# Patient Record
Sex: FEMALE | Race: WHITE | NOT HISPANIC OR LATINO | Employment: FULL TIME | URBAN - METROPOLITAN AREA
[De-identification: names, ages, dates, MRNs, and addresses within clinical notes are randomized per-mention and may not be internally consistent; named-entity substitution may affect disease eponyms.]

---

## 2017-02-23 ENCOUNTER — TRANSFERRED RECORDS (OUTPATIENT)
Dept: HEALTH INFORMATION MANAGEMENT | Facility: CLINIC | Age: 32
End: 2017-02-23

## 2017-02-23 LAB — PAP-ABSTRACT: NORMAL

## 2019-11-13 ENCOUNTER — ANCILLARY PROCEDURE (OUTPATIENT)
Dept: GENERAL RADIOLOGY | Facility: CLINIC | Age: 34
End: 2019-11-13
Attending: NURSE PRACTITIONER
Payer: COMMERCIAL

## 2019-11-13 ENCOUNTER — OFFICE VISIT (OUTPATIENT)
Dept: FAMILY MEDICINE | Facility: CLINIC | Age: 34
End: 2019-11-13
Payer: COMMERCIAL

## 2019-11-13 VITALS
TEMPERATURE: 96.4 F | SYSTOLIC BLOOD PRESSURE: 96 MMHG | HEIGHT: 62 IN | DIASTOLIC BLOOD PRESSURE: 58 MMHG | WEIGHT: 161 LBS | BODY MASS INDEX: 29.63 KG/M2 | HEART RATE: 108 BPM | OXYGEN SATURATION: 99 %

## 2019-11-13 DIAGNOSIS — G89.29 CHRONIC LEFT-SIDED LOW BACK PAIN, UNSPECIFIED WHETHER SCIATICA PRESENT: ICD-10-CM

## 2019-11-13 DIAGNOSIS — M54.2 CERVICALGIA: ICD-10-CM

## 2019-11-13 DIAGNOSIS — Z30.011 ENCOUNTER FOR INITIAL PRESCRIPTION OF CONTRACEPTIVE PILLS: ICD-10-CM

## 2019-11-13 DIAGNOSIS — Z00.00 ROUTINE GENERAL MEDICAL EXAMINATION AT A HEALTH CARE FACILITY: Primary | ICD-10-CM

## 2019-11-13 DIAGNOSIS — R10.32 LLQ ABDOMINAL PAIN: ICD-10-CM

## 2019-11-13 DIAGNOSIS — M54.50 CHRONIC LEFT-SIDED LOW BACK PAIN, UNSPECIFIED WHETHER SCIATICA PRESENT: ICD-10-CM

## 2019-11-13 PROBLEM — Z92.29 HISTORY OF BCG VACCINATION: Status: ACTIVE | Noted: 2017-12-08

## 2019-11-13 LAB
ALBUMIN UR-MCNC: NEGATIVE MG/DL
APPEARANCE UR: ABNORMAL
BILIRUB UR QL STRIP: NEGATIVE
COLOR UR AUTO: YELLOW
GLUCOSE UR STRIP-MCNC: NEGATIVE MG/DL
HGB UR QL STRIP: ABNORMAL
KETONES UR STRIP-MCNC: NEGATIVE MG/DL
LEUKOCYTE ESTERASE UR QL STRIP: NEGATIVE
MUCOUS THREADS #/AREA URNS LPF: PRESENT /LPF
NITRATE UR QL: NEGATIVE
NON-SQ EPI CELLS #/AREA URNS LPF: ABNORMAL /LPF
PH UR STRIP: 5 PH (ref 5–7)
RBC #/AREA URNS AUTO: ABNORMAL /HPF
SOURCE: ABNORMAL
SP GR UR STRIP: >1.03 (ref 1–1.03)
UROBILINOGEN UR STRIP-ACNC: 0.2 EU/DL (ref 0.2–1)
WBC #/AREA URNS AUTO: ABNORMAL /HPF

## 2019-11-13 PROCEDURE — 99213 OFFICE O/P EST LOW 20 MIN: CPT | Mod: 25 | Performed by: NURSE PRACTITIONER

## 2019-11-13 PROCEDURE — 99385 PREV VISIT NEW AGE 18-39: CPT | Mod: 25 | Performed by: NURSE PRACTITIONER

## 2019-11-13 PROCEDURE — 81001 URINALYSIS AUTO W/SCOPE: CPT | Performed by: NURSE PRACTITIONER

## 2019-11-13 PROCEDURE — 72040 X-RAY EXAM NECK SPINE 2-3 VW: CPT

## 2019-11-13 PROCEDURE — 90471 IMMUNIZATION ADMIN: CPT | Performed by: NURSE PRACTITIONER

## 2019-11-13 PROCEDURE — 90686 IIV4 VACC NO PRSV 0.5 ML IM: CPT | Performed by: NURSE PRACTITIONER

## 2019-11-13 RX ORDER — NORETHINDRONE ACETATE AND ETHINYL ESTRADIOL 1MG-20(21)
1 KIT ORAL DAILY
Qty: 84 TABLET | Refills: 3 | Status: SHIPPED | OUTPATIENT
Start: 2019-11-13 | End: 2023-10-09

## 2019-11-13 RX ORDER — CYCLOBENZAPRINE HCL 5 MG
5 TABLET ORAL 3 TIMES DAILY PRN
Qty: 30 TABLET | Refills: 1 | Status: SHIPPED | OUTPATIENT
Start: 2019-11-13 | End: 2023-10-09

## 2019-11-13 ASSESSMENT — ENCOUNTER SYMPTOMS
CONSTIPATION: 1
ABDOMINAL PAIN: 1
ARTHRALGIAS: 1

## 2019-11-13 ASSESSMENT — MIFFLIN-ST. JEOR: SCORE: 1380.6

## 2019-11-13 NOTE — LETTER
[facility]  [address]  [place] [address]  [phone]          November 13, 2019    Hadkimmy Yang                                                                                                                       [address]  [facility] [address]            To Whom It May Concern,    Due to low back pain, patient may not do any lifting for the next 6 weeks. Recheck in 6 weeks.    Sincerely,         Sloane Barreto [employer] CNP

## 2019-11-13 NOTE — LETTER
[address] [place] [address]    November 14, 2019    Ethan Yang    [address]  [place] [address]  Dear Ethan,    Your results are normal    Enclosed is a copy of your results.     Results for orders placed or performed in visit on 11/13/19   UA reflex to Microscopic and Culture     Status: Abnormal   Result Value Ref Range    Color Urine Yellow     Appearance Urine Slightly Cloudy     Glucose Urine Negative NEG^Negative mg/dL    Bilirubin Urine Negative NEG^Negative    Ketones Urine Negative NEG^Negative mg/dL    Specific Gravity Urine >1.030 1.003 - 1.035    Blood Urine Trace (A) NEG^Negative    pH Urine 5.0 5.0 - 7.0 pH    Protein Albumin Urine Negative NEG^Negative mg/dL    Urobilinogen Urine 0.2 0.2 - 1.0 EU/dL    Nitrite Urine Negative NEG^Negative    Leukocyte Esterase Urine Negative NEG^Negative    Source Midstream Urine    Urine Microscopic     Status: Abnormal   Result Value Ref Range    WBC Urine 0 - 5 OTO5^0 - 5 /HPF    RBC Urine 2-5 (A) OTO2^O - 2 /HPF    Squamous Epithelial /LPF Urine Few FEW^Few /LPF    Mucous Urine Present (A) NEG^Negative /LPF     If you have any questions or concerns, please call myself or my nurse at [phone].      Sincerely,        Sloane Lea CNP/ha

## 2019-11-13 NOTE — PROGRESS NOTES
"   SUBJECTIVE:   CC: Ethan Yang is an 34 year old woman who presents for preventive health visit.     Healthy Habits:     Getting at least 3 servings of Calcium per day:  Yes    Bi-annual eye exam:  NO    Dental care twice a year:  Yes    Sleep apnea or symptoms of sleep apnea:  None    Diet:  Regular (no restrictions)    Frequency of exercise:  None    Taking medications regularly:  Yes    Medication side effects:  None    PHQ-2 Total Score: 0    Additional concerns today:  No    Had had lower abdominal pain coming and going for \"a long time\"- sometimes once/month or once/week. Also has low back pain with associated weakness in the upper and lower extremities. Did not improve with Physical Therapy- stopped after 2 appointments.  Prior abdominal US, pelvic US, lumbar x-ray reviewed in CareEverywhere.        Today's PHQ-2 Score:   PHQ-2 ( 1999 [employer]) 11/13/2019   Q1: Little interest or pleasure in doing things 0   Q2: Feeling down, depressed or hopeless 0   PHQ-2 Score 0   Q1: Little interest or pleasure in doing things Not at all   Q2: Feeling down, depressed or hopeless Not at all   PHQ-2 Score 0       Abuse: Current or Past(Physical, Sexual or Emotional)- No  Do you feel safe in your environment? Yes        Social History     Tobacco Use     Smoking status: Not on file   Substance Use Topics     Alcohol use: Not on file     If you drink alcohol do you typically have >3 drinks per day or >7 drinks per week? No    Alcohol Use 11/13/2019   Prescreen: >3 drinks/day or >7 drinks/week? No   Prescreen: >3 drinks/day or >7 drinks/week? -       Reviewed orders with patient.  Reviewed health maintenance and updated orders accordingly - Yes  Patient Active Problem List   Diagnosis     History of BCG vaccination     BMI 29.0-29.9,adult     Past Surgical History:   Procedure Laterality Date     NO HISTORY OF SURGERY         Social History     Tobacco Use     Smoking status: Not on file   Substance Use Topics     Alcohol use: " "Not on file     Family History   Problem Relation Age of Onset     Diabetes Mother      Cancer No family hx of      Heart Disease No family hx of            Mammogram not appropriate for this patient based on age.    Pertinent mammograms are reviewed under the imaging tab.  History of abnormal Pap smear: NO - age 30-65 PAP every 5 years with negative HPV co-testing recommended     Reviewed and updated as needed this visit by clinical staff  Allergies  Meds         Reviewed and updated as needed this visit by Provider            Review of Systems   Constitutional: Negative for chills and fever.   HENT: Negative for congestion, ear pain, hearing loss and sore throat.    Eyes: Negative for pain and visual disturbance.   Respiratory: Negative for cough and shortness of breath.    Cardiovascular: Negative for chest pain, palpitations and peripheral edema.   Gastrointestinal: Positive for abdominal pain and constipation. Negative for diarrhea, heartburn, hematochezia and nausea.   Breasts:  Negative for tenderness, breast mass and discharge.   Genitourinary: Negative for dysuria, frequency, genital sores, hematuria, pelvic pain, urgency, vaginal bleeding and vaginal discharge.   Musculoskeletal: Positive for arthralgias. Negative for joint swelling and myalgias.   Skin: Negative for rash.   Neurological: Negative for dizziness, weakness, headaches and paresthesias.   Psychiatric/Behavioral: Negative for mood changes. The patient is not nervous/anxious.           OBJECTIVE:   BP 96/58 (BP Location: Left arm, Patient Position: Chair, Cuff Size: Adult Regular)   Pulse 108   Temp 96.4  F (35.8  C) (Oral)   Ht 1.562 m (5' 1.5\")   Wt 73 kg (161 lb)   LMP 11/08/2019   SpO2 99%   BMI 29.93 kg/m    Physical Exam  GENERAL: healthy, alert and no distress  EYES: Eyes grossly normal to inspection, PERRL and conjunctivae and sclerae normal  HENT: ear canals and TM's normal, nose and mouth without ulcers or lesions  NECK: no " adenopathy, no asymmetry, masses, or scars and thyroid normal to palpation  RESP: lungs clear to auscultation - no rales, rhonchi or wheezes  BREAST: normal without masses, tenderness or nipple discharge and no palpable axillary masses or adenopathy  CV: regular rate and rhythm, normal S1 S2, no S3 or S4, no murmur, click or rub, no peripheral edema and peripheral pulses strong  ABDOMEN: soft, nontender, no hepatosplenomegaly, no masses and bowel sounds normal  MS: Diffuse tenderness of cervical, thoracic, lumbar spine and paraspinous muscles.  SKIN: no suspicious lesions or rashes  NEURO: Normal strength and tone, mentation intact and speech normal  PSYCH: mentation appears normal, affect normal/bright    Diagnostic Test Results:  Labs reviewed in Epic  Results for orders placed or performed in visit on 11/13/19 (from the past 24 hour(s))   UA reflex to Microscopic and Culture   Result Value Ref Range    Color Urine Yellow     Appearance Urine Slightly Cloudy     Glucose Urine Negative NEG^Negative mg/dL    Bilirubin Urine Negative NEG^Negative    Ketones Urine Negative NEG^Negative mg/dL    Specific Gravity Urine >1.030 1.003 - 1.035    Blood Urine Trace (A) NEG^Negative    pH Urine 5.0 5.0 - 7.0 pH    Protein Albumin Urine Negative NEG^Negative mg/dL    Urobilinogen Urine 0.2 0.2 - 1.0 EU/dL    Nitrite Urine Negative NEG^Negative    Leukocyte Esterase Urine Negative NEG^Negative    Source Midstream Urine    Urine Microscopic   Result Value Ref Range    WBC Urine 0 - 5 OTO5^0 - 5 /HPF    RBC Urine 2-5 (A) OTO2^O - 2 /HPF    Squamous Epithelial /LPF Urine Few FEW^Few /LPF    Mucous Urine Present (A) NEG^Negative /LPF     Xray - C-spine- see epic    ASSESSMENT/PLAN:   1. Routine general medical examination at a health care facility    - Urine Microscopic    2. BMI 29.0-29.9,adult  Diet/exercise counseling    3. Chronic left-sided low back pain, unspecified whether sciatica present  Recommend plain film of c-spine  "due to possible radicular symptoms. Start Physical Therapy- needs 5-6 sessions to see improvement. Ok for Naproxen (already taking PRN) and may use Flexeril at HS as needed. Work restrictions written.  - UA reflex to Microscopic and Culture  - cyclobenzaprine (FLEXERIL) 5 MG tablet; Take 1 tablet (5 mg) by mouth 3 times daily as needed for muscle spasms  Dispense: 30 tablet; Refill: 1  - [facility]; Future    4. Cervicalgia    - XR Cervical Spine 2/3 Views; Future  - cyclobenzaprine (FLEXERIL) 5 MG tablet; Take 1 tablet (5 mg) by mouth 3 times daily as needed for muscle spasms  Dispense: 30 tablet; Refill: 1  - [facility]FERR[facility]; Future    5. LLQ abdominal pain  Not fully addressed- possibly radicular low back pain but may be related to constipation. Will reassess at follow up    6. Encounter for initial prescription of contraceptive pills  Continue without change  - norethindrone-ethinyl estradiol (JUNEL FE 1/20) 1-20 MG-MCG tablet; Take 1 tablet by mouth daily  Dispense: 84 tablet; Refill: 3    COUNSELING:  Reviewed preventive health counseling, as reflected in patient instructions       Regular exercise       Healthy diet/nutrition       Immunizations    Vaccinated for: Influenza             Contraception    Estimated body mass index is 29.93 kg/m  as calculated from the following:    Height as of this encounter: 1.562 m (5' 1.5\").    Weight as of this encounter: 73 kg (161 lb).    Weight management plan: Discussed healthy diet and exercise guidelines     has no history on file for tobacco.      Counseling Resources:  ATP IV Guidelines  Pooled Cohorts Equation Calculator  Breast Cancer Risk Calculator  FRAX Risk Assessment  ICSI Preventive Guidelines  Dietary Guidelines for Americans, 2010  USDA's MyPlate  ASA Prophylaxis  Lung CA Screening    LOLY Munoz CNP  [facility]  "

## 2019-11-13 NOTE — Clinical Note
Please abstract the following data from this visit with this patient into the appropriate field in Epic:Tests that can be patient reported without a hard copy:Pap smear done on this date: 2/23/17 (approximately), by this group: [facility], results were NIL.

## 2019-11-14 ASSESSMENT — ENCOUNTER SYMPTOMS
HEADACHES: 0
DIZZINESS: 0
NAUSEA: 0
FREQUENCY: 0
BREAST MASS: 0
CHILLS: 0
NERVOUS/ANXIOUS: 0
HEMATURIA: 0
JOINT SWELLING: 0
HEMATOCHEZIA: 0
SORE THROAT: 0
MYALGIAS: 0
DIARRHEA: 0
FEVER: 0
HEARTBURN: 0
DYSURIA: 0
PALPITATIONS: 0
COUGH: 0
SHORTNESS OF BREATH: 0
WEAKNESS: 0
PARESTHESIAS: 0
EYE PAIN: 0

## 2019-11-14 NOTE — RESULT ENCOUNTER NOTE
Please call the patient with these results:    Neck x-ray is normal. I recommend starting Physical Therapy- referral placed.    Sloane Lea, CNP

## 2019-11-18 ENCOUNTER — TELEPHONE (OUTPATIENT)
Dept: FAMILY MEDICINE | Facility: CLINIC | Age: 34
End: 2019-11-18

## 2019-11-18 NOTE — TELEPHONE ENCOUNTER
Reason for Call:  Other call back & letter     Detailed comments: Patient is requesting letter to send to employer to state she can work and lift any weight. Please call to advise. Will need .     Phone Number Patient can be reached at: Home number on file [phone] (home)    Best Time: Any     Can we leave a detailed message on this number? YES    Call taken on 11/18/2019 at 7:18 AM by Jessi Weiner

## 2019-11-18 NOTE — TELEPHONE ENCOUNTER
Patient was placed on restrictions due to chronic low back pain & was also referred to Physical Therapy for treatment. I do not recommend removing these restrictions at this time due to risk for ongoing back pain. Please clarify why patient is requesting restrictions to be removed.    Sloane Lea, CNP

## 2019-11-18 NOTE — TELEPHONE ENCOUNTER
Called and spoke with patient via Ukrainian  and gave information below.   She states she doesn't want the full restrictions lifted but she said that she could work as a  where she gets breaks and lunch and would not be lifting heavy objects. Would only be standing there   Patient requested an appointment, scheduled for Wednesday at 1:40 PM.     Heidy Mon RN

## 2019-11-18 NOTE — TELEPHONE ENCOUNTER
Spoke to patient via  and she said that she was just seen on 11/13 and don't know why she can't get a letter. I tried to schedule patient but there is no openings available until Dec. 2 and patient needs letter before then. Patient is wondering if she can be fit in to be seen or just have letter written saying that she is okay to work and lift   Breonna Charles CMA on 11/18/2019 at 9:22 AM

## 2019-11-20 ENCOUNTER — OFFICE VISIT (OUTPATIENT)
Dept: FAMILY MEDICINE | Facility: CLINIC | Age: 34
End: 2019-11-20
Payer: COMMERCIAL

## 2019-11-20 VITALS
TEMPERATURE: 96.7 F | DIASTOLIC BLOOD PRESSURE: 62 MMHG | SYSTOLIC BLOOD PRESSURE: 100 MMHG | BODY MASS INDEX: 29.81 KG/M2 | HEART RATE: 90 BPM | HEIGHT: 62 IN | WEIGHT: 162 LBS | OXYGEN SATURATION: 100 %

## 2019-11-20 DIAGNOSIS — G89.29 CHRONIC LEFT-SIDED LOW BACK PAIN WITHOUT SCIATICA: Primary | ICD-10-CM

## 2019-11-20 DIAGNOSIS — M54.50 CHRONIC LEFT-SIDED LOW BACK PAIN WITHOUT SCIATICA: Primary | ICD-10-CM

## 2019-11-20 PROCEDURE — 99212 OFFICE O/P EST SF 10 MIN: CPT | Performed by: NURSE PRACTITIONER

## 2019-11-20 ASSESSMENT — MIFFLIN-ST. JEOR: SCORE: 1385.14

## 2019-11-20 NOTE — PROGRESS NOTES
"Subjective     Ethan Yang is a 34 year old female who presents to clinic today for the following health issues:    HPI       Chief Complaint   Patient presents with     Work Comp     Low Back Pain Fup       Patient presents for follow up of left low back pain. She is here because she would like work restrictions lifted- her employer states she cannot work with any restrictions on file.     Notably, abdominal pain was not fully addressed at last OV but patient states she is no longer having pain and declines further evaluation of this.    Reviewed and updated as needed this visit by Provider         Review of Systems   ROS COMP: Constitutional, HEENT, cardiovascular, pulmonary, gi and gu systems are negative, except as otherwise noted.      Objective    /62 (BP Location: Left arm, Patient Position: Chair, Cuff Size: Adult Regular)   Pulse 90   Temp 96.7  F (35.9  C) (Oral)   Ht 1.562 m (5' 1.5\")   Wt 73.5 kg (162 lb)   LMP 11/08/2019   SpO2 100%   BMI 30.11 kg/m    Body mass index is 30.11 kg/m .  Physical Exam   GENERAL: healthy, alert and no distress  PSYCH: mentation appears normal, affect normal/bright    Diagnostic Test Results:  Labs reviewed in Epic        Assessment & Plan     1. Chronic left-sided low back pain without sciatica  Patient still experiencing pain and will start Physical Therapy as previously recommended. Patient aware that I do not recommend heavy lifting due risk for ongoing back pain/repeat injury, but I will lift her formal work restrictions per her request. She will work with her employer to be transferred to another department that does not require repetitive lifting.         Return in about 2 months (around 1/20/2020) for if back pain not improving.    LOLY Munoz [facility]        "

## 2019-11-20 NOTE — LETTER
[facility]  [address]  [place] [address]  [phone]          November 20, 2019    Ethan Yang                                                                                                                       [address]  [facility] [address]            To Whom It May Concern,    Ethan Yang may return to work without restrictions 11/21/19.         Sincerely,         Sloane Bar[employer] CNP

## 2019-12-03 ENCOUNTER — APPOINTMENT (OUTPATIENT)
Dept: OPTOMETRY | Facility: CLINIC | Age: 34
End: 2019-12-03
Payer: COMMERCIAL

## 2019-12-03 ENCOUNTER — OFFICE VISIT (OUTPATIENT)
Dept: OPTOMETRY | Facility: CLINIC | Age: 34
End: 2019-12-03
Payer: COMMERCIAL

## 2019-12-03 DIAGNOSIS — H52.13 MYOPIA OF BOTH EYES: ICD-10-CM

## 2019-12-03 DIAGNOSIS — Z98.890 S/P LASIK SURGERY: ICD-10-CM

## 2019-12-03 DIAGNOSIS — Z01.01 ENCOUNTER FOR EXAMINATION OF EYES AND VISION WITH ABNORMAL FINDINGS: Primary | ICD-10-CM

## 2019-12-03 DIAGNOSIS — H52.223 REGULAR ASTIGMATISM OF BOTH EYES: ICD-10-CM

## 2019-12-03 PROCEDURE — 92015 DETERMINE REFRACTIVE STATE: CPT | Performed by: OPTOMETRIST

## 2019-12-03 PROCEDURE — 92004 COMPRE OPH EXAM NEW PT 1/>: CPT | Performed by: OPTOMETRIST

## 2019-12-03 PROCEDURE — V2020 VISION SVCS FRAMES PURCHASES: HCPCS | Performed by: OPTOMETRIST

## 2019-12-03 PROCEDURE — V2200 LENS SPHER BIFOC PLANO 4.00D: HCPCS | Mod: LT | Performed by: OPTOMETRIST

## 2019-12-03 ASSESSMENT — TONOMETRY
IOP_METHOD: APPLANATION
OS_IOP_MMHG: 14
OD_IOP_MMHG: 15

## 2019-12-03 ASSESSMENT — VISUAL ACUITY
METHOD: SNELLEN - LINEAR
OS_SC: 20/40
OD_SC: 20/30
OD_SC: 20/30 -1
OS_SC: 20/40
OS_SC+: -1

## 2019-12-03 ASSESSMENT — CONF VISUAL FIELD
OD_NORMAL: 1
OS_NORMAL: 1

## 2019-12-03 ASSESSMENT — EXTERNAL EXAM - RIGHT EYE: OD_EXAM: NORMAL

## 2019-12-03 ASSESSMENT — SLIT LAMP EXAM - LIDS
COMMENTS: NORMAL
COMMENTS: NORMAL

## 2019-12-03 ASSESSMENT — EXTERNAL EXAM - LEFT EYE: OS_EXAM: NORMAL

## 2019-12-03 ASSESSMENT — REFRACTION_MANIFEST
OD_AXIS: 080
OD_CYLINDER: +1.25
OS_SPHERE: -0.50
OS_CYLINDER: +0.75
OD_SPHERE: -1.50
OS_AXIS: 108

## 2019-12-03 ASSESSMENT — CUP TO DISC RATIO
OS_RATIO: 0.15
OD_RATIO: 0.2

## 2019-12-03 NOTE — PROGRESS NOTES
Chief Complaint   Patient presents with     Annual Eye Exam      Accompanied by   Last Eye Exam: 8 years ago  Dilated Previously: Yes    What are you currently using to see?  does not use glasses or contacts       Distance Vision Acuity: Noticed gradual change in both eyes, difficulty with driving; history of LASIK procedure 8 years ago    Near Vision Acuity: Satisfied with vision while reading  unaided    Eye Comfort: teary  Do you use eye drops? : No  Occupation or Hobbies: [employer], [employer]          Medical, surgical and family histories reviewed and updated 12/3/2019.       OBJECTIVE: See Ophthalmology exam    ASSESSMENT:    ICD-10-CM    1. Encounter for examination of eyes and vision with abnormal findings Z01.01    2. S/P LASIK surgery Z98.890    3. Myopia of both eyes H52.13    4. Regular astigmatism of both eyes H52.223       PLAN:     Patient Instructions   History of LASIK procedure ~8 years ago.     Glasses prescription provided today.     Return for contact lens fitting, if desired.     The effects of the dilating drops last for 4- 6 hours.  You will be more sensitive to light and vision will be blurry up close.  Mydriatic sunglasses were given if needed.      Irwin Andino O.D.  [address]  [place]  [address]    [phone]

## 2019-12-03 NOTE — LETTER
12/3/2019         RE: Ethan Yang  [address]  [address]  [place] [address]        Dear Colleague,    Thank you for referring your patient, Ethan Yang, to the [facility]. Please see a copy of my visit note below.    Chief Complaint   Patient presents with     Annual Eye Exam      Accompanied by   Last Eye Exam: 8 years ago  Dilated Previously: Yes    What are you currently using to see?  does not use glasses or contacts       Distance Vision Acuity: Noticed gradual change in both eyes, difficulty with driving; history of LASIK procedure 8 years ago    Near Vision Acuity: Satisfied with vision while reading  unaided    Eye Comfort: teary  Do you use eye drops? 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       OBJECTIVE: See Ophthalmology exam    ASSESSMENT:    ICD-10-CM    1. Encounter for examination of eyes and vision with abnormal findings Z01.01    2. S/P LASIK surgery Z98.890    3. Myopia of both eyes H52.13    4. Regular astigmatism of both eyes H52.223       PLAN:     Patient Instructions   History of LASIK procedure ~8 years ago.     Glasses prescription provided today.     Return for contact lens fitting, if desired.     The effects of the dilating drops last for 4- 6 hours.  You will be more sensitive to light and vision will be blurry up close.  Mydriatic sunglasses were given if needed.      Irwin Andino O.D.  [address] [place]  [address]    [phone]           Again, thank you for allowing me to participate in the care of your patient.        Sincerely,        Irwin Andino, OD

## 2019-12-03 NOTE — PATIENT INSTRUCTIONS
History of LASIK procedure ~8 years ago.     Glasses prescription provided today.     Return for contact lens fitting, if desired.     The effects of the dilating drops last for 4- 6 hours.  You will be more sensitive to light and vision will be blurry up close.  Mydriatic sunglasses were given if needed.      Irwin Andino O.D.  [facility] [address]  [place]  [address]    [phone]

## 2019-12-05 ENCOUNTER — OFFICE VISIT (OUTPATIENT)
Dept: OPTOMETRY | Facility: CLINIC | Age: 34
End: 2019-12-05
Payer: COMMERCIAL

## 2019-12-05 DIAGNOSIS — H52.223 REGULAR ASTIGMATISM OF BOTH EYES: ICD-10-CM

## 2019-12-05 DIAGNOSIS — Z46.0 CONTACT LENS/GLASSES FITTING: Primary | ICD-10-CM

## 2019-12-05 PROCEDURE — 92310 CONTACT LENS FITTING OU: CPT | Mod: GA | Performed by: OPTOMETRIST

## 2019-12-05 PROCEDURE — 99207 ZZC NO CHARGE LOS: CPT | Performed by: OPTOMETRIST

## 2019-12-05 ASSESSMENT — SLIT LAMP EXAM - LIDS
COMMENTS: NORMAL
COMMENTS: NORMAL

## 2019-12-05 ASSESSMENT — REFRACTION_CURRENTRX
OS_CYLINDER: -0.75
OD_SPHERE: PLANO
OS_BRAND: ALCON AIR OPTIX FOR ASTIGMATISM BC 8.7, D 14.5
OS_CYLINDER: -0.75
OD_BRAND: COOPER BIOFINITY TORIC BC 8.7, D 14.5
OS_AXIS: 020
OD_BRAND: ALCON AIR OPTIX FOR ASTIGMATISM BC 8.7, D 14.5
OS_SPHERE: PLANO
OD_CYLINDER: -1.25
OS_SPHERE: PLANO
OD_CYLINDER: -1.25
OD_AXIS: 170
OS_BRAND: COOPER BIOFINITY TORIC BC 8.7, D 14.5
OD_SPHERE: PLANO
OS_AXIS: 020
OD_AXIS: 170

## 2019-12-05 ASSESSMENT — VISUAL ACUITY
OD_SC: 20/25
OS_SC: 20/40
METHOD: SNELLEN - LINEAR
OS_SC+: -1
OD_SC+: -1

## 2019-12-05 ASSESSMENT — EXTERNAL EXAM - RIGHT EYE: OD_EXAM: NORMAL

## 2019-12-05 ASSESSMENT — EXTERNAL EXAM - LEFT EYE: OS_EXAM: NORMAL

## 2019-12-05 NOTE — PATIENT INSTRUCTIONS
Fit with Air Optix for Astigmastism contact lenses today.   Begin trial of contact lenses.   No sleeping or swimming in the lenses.   Maximum wear time of 12 hours per day.   Remove the lenses and clean in solution every night.     Provided with OptiFree sample today. Lot # [id number], Expiration 3/31/2021    Return in 2 weeks for follow-up.       Irwin Andino O.D.  [address] [facility] [place]  [address]    [phone]

## 2019-12-05 NOTE — PROGRESS NOTES
Contact Lens Fitting    Patient is here today for contact lens fitting. Patient has worn contact lenses in the past; Last worn ~8 years ago before LASIK procedure.     Sammie Canales    Optometry Tech

## 2019-12-05 NOTE — LETTER
12/5/2019         RE: Ethan Yang  [address]  [facility] [address]        Dear Colleague,    Thank you for referring your patient, Ethan Yang, to the [facility]. Please see a copy of my visit note below.    Contact Lens Fitting    Patient is here today for contact lens fitting. Patient has worn contact lenses in the past; Last worn ~8 years ago before LASIK procedure.     Sammie Canales    Optometry Tech      Again, thank you for allowing me to participate in the care of your patient.        Sincerely,        Irwin Andino, OD

## 2019-12-11 ENCOUNTER — THERAPY VISIT (OUTPATIENT)
Dept: PHYSICAL THERAPY | Facility: CLINIC | Age: 34
End: 2019-12-11
Attending: NURSE PRACTITIONER
Payer: COMMERCIAL

## 2019-12-11 DIAGNOSIS — M54.2 CERVICALGIA: ICD-10-CM

## 2019-12-11 DIAGNOSIS — M54.50 CHRONIC LEFT-SIDED LOW BACK PAIN, UNSPECIFIED WHETHER SCIATICA PRESENT: ICD-10-CM

## 2019-12-11 DIAGNOSIS — G89.29 CHRONIC LEFT-SIDED LOW BACK PAIN, UNSPECIFIED WHETHER SCIATICA PRESENT: ICD-10-CM

## 2019-12-11 PROCEDURE — 97140 MANUAL THERAPY 1/> REGIONS: CPT | Mod: GP | Performed by: PHYSICAL THERAPIST

## 2019-12-11 PROCEDURE — 97112 NEUROMUSCULAR REEDUCATION: CPT | Mod: GP | Performed by: PHYSICAL THERAPIST

## 2019-12-11 PROCEDURE — 97161 PT EVAL LOW COMPLEX 20 MIN: CPT | Mod: GP | Performed by: PHYSICAL THERAPIST

## 2019-12-11 PROCEDURE — 97110 THERAPEUTIC EXERCISES: CPT | Mod: GP | Performed by: PHYSICAL THERAPIST

## 2019-12-11 NOTE — LETTER
[facility]  [address]  [address]  [place] [address]  [phone]    2019    Re: Ethan Yang   :   1985  MRN:  [id number]   REFERRING PHYSICIAN:   Sloane Lea, CNP    [facility]  Date of Initial Evaluation:  2019  Visits:  Rxs Used:   Reason for Referral:     Chronic left-sided low back pain, unspecified whether sciatica present  Cervicalgia    EVALUATION SUMMARY    [facility] Initial Evaluation  Subjective:  The history is provided by the patient. A  was used.   Ethan Yang being seen for low back pain with leg pain bilat.   Problem began 2019. Where condition occurred: at home and at work.Problem occurred: working [employer] lifts heavy boxes  and reported as 10/10 (5/10) on pain scale. General health as reported by patient is good.   Surgeries include:  None.  Current medications:  None.   Primary job tasks include:  Prolonged standing and repetitive tasks.  Pain is described as sharp and is constant. Pain is worse during the night (worse as day goes on at work. IN AM getting out of bed can hardly move so stiff and painful. PAin is bad with praying and prayer positions 5 x a day). Since onset symptoms are gradually improving.  Patient is full time was working in [employer] heavy lifting in [employer] section, nowreduced to part time in clothing. Restrictions include:  Working in normal job with restrictions (changed job duties and dropped to part time).    Barriers include:  None as reported by patient.  Red flags:  None as reported by patient.  Type of problem:  Lumbar  Condition occurred with:  Repetition/overuse. This is a new condition   Problem details: Has bilat calf pain no radiation.  Pain interferes with  Prayer positioins  Pain is interfering with sexual intercourse.   Patient reports pain:  Central lumbar spine.   Symptoms are exacerbated by standing, bending, carrying, lifting and lying down and  relieved by nothing.          Objective:  Standing Alignment:    Cervical/Thoracic:  Forward head  Shoulder/UE:  Rounded shoulders  Lumbar:  Lordosis incr and anterior pelvic tilt        Re: Ethan Yang   :   1985       Lumbar/SI Evaluation  ROM:  Arom wnl lumbar: Pt  only able to squat 40%  AROM Lumbar:   Flexion:          Hands to knee stronge SB L  Ext:                    Mod loss of motioin fulcrums at L 4   Side Bend:        Left:  Poor curves and has pain    Right:  Poor curves and has pain  Rotation:           Left:     Right:   Side Glide:        Left:     Right:     Neural Tension/Mobility:    Left side:Slump  negative.   Right side:   Slump  negative.     Assessment/Plan:    Patient is a 34 year old female with cervical and lumbar complaints.    Patient has the following significant findings with corresponding treatment plan.                Diagnosis 1:  Neck and back pain  Pain -  US, manual therapy, self management, education and home program  Decreased ROM/flexibility - manual therapy, therapeutic exercise, therapeutic activity and home program  Decreased joint mobility - manual therapy, therapeutic exercise, therapeutic activity and home program  Decreased strength - therapeutic exercise, therapeutic activities and home program  Impaired muscle performance - neuro re-education and home program  Decreased function - therapeutic activities and home program  Impaired posture - neuro re-education, therapeutic activities and home program    Cumulative Therapy Evaluation is: Low complexity.    Previous and current functional limitations:  (See Goal Flow Sheet for this information)    Short term and Long term goals: (See Goal Flow Sheet for this information)     Communication ability:  Patient has an  for communication clarity.  Treatment Explanation - The following has been discussed with the patient:   RX ordered/plan of care  Anticipated outcomes  Possible risks and side effects  This  patient would benefit from PT intervention to resume normal activities.   Rehab potential is good.    Frequency:  1 X week, once daily  Duration:  for 8 weeks  Discharge Plan:  Achieve all LTG.  Independent in home treatment program.  Return to previous functional level by discharge.  Reach maximal therapeutic benefit.    Please refer to the daily flowsheet for treatment today, total treatment time and time spent performing 1:1 timed codes.       Re: Ethan Yang   :   1985      Thank you for your referral.    INQUIRIES  Therapist: RADHA Bundy PT   [facility]  [address]  [address] [facility]  [place] [address]  Phone: [phone]  Fax: [phone]

## 2019-12-11 NOTE — PROGRESS NOTES
[facility] Initial Evaluation  Subjective:  The history is provided by the patient. A  was used.   Ethan Yang being seen for low back pain with leg pain bilat.   Problem began 9/30/2019. Where condition occurred: at home and at work.Problem occurred: working [employer] lifts heavy boxes  and reported as 10/10 (5/10) on pain scale. General health as reported by patient is good.     Surgeries include:  None.  Current medications:  None.   Primary job tasks include:  Prolonged standing and repetitive tasks.  Pain is described as sharp and is constant. Pain is worse during the night (worse as day goes on at work. IN AM getting out of bed can hardly move so stiff and painful. PAin is bad with praying and prayer positions 5 x a day). Since onset symptoms are gradually improving.      Patient is full time was working in [employer] heavy lifting in [employer] section, nowreduced to part time in clothing. Restrictions include:  Working in normal job with restrictions (changed job duties and dropped to part time).    Barriers include:  None as reported by patient.  Red flags:  None as reported by patient.  Type of problem:  Lumbar   Condition occurred with:  Repetition/overuse. This is a new condition   Problem details: Has bilat calf pain no radiation.  Pain interferes with  Prayer positioins  Pain is interfering with sexual intercourse.   Patient reports pain:  Central lumbar spine.   Symptoms are exacerbated by standing, bending, carrying, lifting and lying down and relieved by nothing.                      Objective:  Standing Alignment:    Cervical/Thoracic:  Forward head  Shoulder/UE:  Rounded shoulders  Lumbar:  Lordosis incr and anterior pelvic tilt                           Lumbar/SI Evaluation  ROM:  Arom wnl lumbar: Pt  only able to squat 40%  AROM Lumbar:   Flexion:          Hands to knee stronge SB L  Ext:                    Mod loss of motioin fulcrums at L 4   Side Bend:         Left:  Poor curves and has pain    Right:  Poor curves and has pain  Rotation:           Left:     Right:   Side Glide:        Left:     Right:                   Neural Tension/Mobility:      Left side:Slump  negative.     Right side:   Slump  negative.                                                        General     ROS    Assessment/Plan:    Patient is a 34 year old female with cervical and lumbar complaints.    Patient has the following significant findings with corresponding treatment plan.                Diagnosis 1:  Neck and back pain  Pain -  US, manual therapy, self management, education and home program  Decreased ROM/flexibility - manual therapy, therapeutic exercise, therapeutic activity and home program  Decreased joint mobility - manual therapy, therapeutic exercise, therapeutic activity and home program  Decreased strength - therapeutic exercise, therapeutic activities and home program  Impaired muscle performance - neuro re-education and home program  Decreased function - therapeutic activities and home program  Impaired posture - neuro re-education, therapeutic activities and home program    Cumulative Therapy Evaluation is: Low complexity.    Previous and current functional limitations:  (See Goal Flow Sheet for this information)    Short term and Long term goals: (See Goal Flow Sheet for this information)     Communication ability:  Patient has an  for communication clarity.  Treatment Explanation - The following has been discussed with the patient:   RX ordered/plan of care  Anticipated outcomes  Possible risks and side effects  This patient would benefit from PT intervention to resume normal activities.   Rehab potential is good.    Frequency:  1 X week, once daily  Duration:  for 8 weeks  Discharge Plan:  Achieve all LTG.  Independent in home treatment program.  Return to previous functional level by discharge.  Reach maximal therapeutic benefit.    Please refer to the daily  flowsheet for treatment today, total treatment time and time spent performing 1:1 timed codes.

## 2019-12-12 ENCOUNTER — APPOINTMENT (OUTPATIENT)
Dept: OPTOMETRY | Facility: CLINIC | Age: 34
End: 2019-12-12
Payer: COMMERCIAL

## 2019-12-12 PROCEDURE — 92340 FIT SPECTACLES MONOFOCAL: CPT | Performed by: OPTOMETRIST

## 2019-12-19 ENCOUNTER — OFFICE VISIT (OUTPATIENT)
Dept: OPTOMETRY | Facility: CLINIC | Age: 34
End: 2019-12-19
Payer: COMMERCIAL

## 2019-12-19 DIAGNOSIS — Z46.0 CONTACT LENS/GLASSES FITTING: Primary | ICD-10-CM

## 2019-12-19 DIAGNOSIS — H52.223 REGULAR ASTIGMATISM OF BOTH EYES: ICD-10-CM

## 2019-12-19 PROCEDURE — 99207 ZZC NO BILLABLE SERVICE THIS VISIT: CPT | Performed by: OPTOMETRIST

## 2019-12-19 ASSESSMENT — REFRACTION_CURRENTRX
OS_AXIS: 020
OS_BRAND: J&J ACUVUE OASYS FOR ASTIGMATISM BC 8.6, D 14.5
OD_BRAND: J&J ACUVUE OASYS FOR ASTIGMATISM BC 8.6, D 14.5
OS_SPHERE: PLANO
OS_CYLINDER: -0.75
OD_AXIS: 170
OD_SPHERE: PLANO
OD_CYLINDER: -1.25

## 2019-12-19 ASSESSMENT — VISUAL ACUITY
METHOD: SNELLEN - LINEAR
OD_SC: 20/40
OS_SC: 20/50

## 2019-12-19 ASSESSMENT — SLIT LAMP EXAM - LIDS
COMMENTS: NORMAL
COMMENTS: NORMAL

## 2019-12-19 ASSESSMENT — EXTERNAL EXAM - LEFT EYE: OS_EXAM: NORMAL

## 2019-12-19 ASSESSMENT — EXTERNAL EXAM - RIGHT EYE: OD_EXAM: NORMAL

## 2019-12-19 NOTE — LETTER
12/19/2019         RE: Ethan Yang  [address]  [facility] [address]        Dear Colleague,    Thank you for referring your patient, Ethan Yang, to the [facility]. Please see a copy of my visit note below.    Chief Complaint   Patient presents with     Contact Lens Follow Up     Satisfied with contacts:  No     Good comfort:  No , eyes water a lot with the lens in  Clear vision:     No , blurry vision, patient is not wearing the lens today.    Sammie Canales, Optometric Tech      Medical, surgical and family histories reviewed and updated 12/19/2019.       OBJECTIVE: See Ophthalmology exam    ASSESSMENT:    ICD-10-CM    1. Contact lens/glasses fitting Z46.0    2. Regular astigmatism of both eyes H52.223       PLAN:    Patient Instructions   Unsuccessful trial of Air Optix for Astigmatism.   Begin trial of Acuvue Oasysy for Astigmatism contact lenses.   No sleeping or swimming in the contact lenses.   Clean the lenses nightly in solution.   These lenses are 2-week replacement lenses.   Maximum wear time of 12 hours per day.     If adequate comfort/vision with contact lenses, we can finalize the prescription. If not, contact me and we can then order in some daily astigmatism trials for you to try.       Irwin Andino O.D.  [address]  [place]  [address]    [phone]                       Again, thank you for allowing me to participate in the care of your patient.        Sincerely,        Irwin Andino, OD

## 2019-12-19 NOTE — PATIENT INSTRUCTIONS
Unsuccessful trial of Air Optix for Astigmatism.   Begin trial of Acuvue Oasysy for Astigmatism contact lenses.   No sleeping or swimming in the contact lenses.   Clean the lenses nightly in solution.   These lenses are 2-week replacement lenses.   Maximum wear time of 12 hours per day.     If adequate comfort/vision with contact lenses, we can finalize the prescription. If not, contact me and we can then order in some daily astigmatism trials for you to try.       Irwin Andino O.D.  [facility] [place]  [place]  [address]    [phone]

## 2019-12-19 NOTE — PROGRESS NOTES
Chief Complaint   Patient presents with     Contact Lens Follow Up     Satisfied with contacts:  No     Good comfort:  No , eyes water a lot with the lens in  Clear vision:     No , blurry vision, patient is not wearing the lens today.    Sammie Canales, Optometric Tech      Medical, surgical and family histories reviewed and updated 12/19/2019.       OBJECTIVE: See Ophthalmology exam    ASSESSMENT:    ICD-10-CM    1. Contact lens/glasses fitting Z46.0    2. Regular astigmatism of both eyes H52.223       PLAN:    Patient Instructions   Unsuccessful trial of Air Optix for Astigmatism.   Begin trial of Acuvue Oasysy for Astigmatism contact lenses.   No sleeping or swimming in the contact lenses.   Clean the lenses nightly in solution.   These lenses are 2-week replacement lenses.   Maximum wear time of 12 hours per day.     If adequate comfort/vision with contact lenses, we can finalize the prescription. If not, contact me and we can then order in some daily astigmatism trials for you to try.       Irwin Andino O.D.  [address]  [place]  [address]    [phone]

## 2020-06-10 ENCOUNTER — PRENATAL OFFICE VISIT (OUTPATIENT)
Dept: OBGYN | Facility: OTHER | Age: 35
End: 2020-06-10
Payer: COMMERCIAL

## 2020-06-10 DIAGNOSIS — Z34.80 PRENATAL CARE, SUBSEQUENT PREGNANCY: ICD-10-CM

## 2020-06-10 PROCEDURE — 99207 ZZC NO CHARGE NURSE ONLY: CPT | Performed by: OBSTETRICS & GYNECOLOGY

## 2020-06-10 PROCEDURE — T1013 SIGN LANG/ORAL INTERPRETER: HCPCS | Mod: U3

## 2020-06-10 RX ORDER — PRENATAL VIT/IRON FUM/FOLIC AC 27MG-0.8MG
1 TABLET ORAL DAILY
COMMUNITY
Start: 2020-05-27

## 2020-06-10 SDOH — HEALTH STABILITY: MENTAL HEALTH: HOW OFTEN DO YOU HAVE A DRINK CONTAINING ALCOHOL?: NEVER

## 2020-06-10 NOTE — PROGRESS NOTES
Intake done with .  having difficulty with noise from patients home to ask questions or hear responses. Patient will definitely need  at visit. She has 2 boys so I did not go over any teaching as the intake was not going very well and just the questions took about an hour to do. She does c/o of severe abdominal pain and N&V for 2 months. She said she was seen for this already and they gave her some medication and it did not help. I encouraged her to call in and talk with triage nurse to see if they want her to come in for these complaints. She appeared understanding and said she would do that. She is calling to get her appointment also at [facility] OB Intake Nurse    Patient supplied answers from flow sheet for:  Prenatal OB Questionnaire.  Past Medical History  Diabetes?: No  Hypertension : No  Heart disease, mitral valve prolapse or rheumatic fever?: No  An autoimmune disease such as lupus or rheumatoid arthritis?: No  Kidney disease or urinary tract infection?: No  Epilepsy, seizures or spells?: No  Migraine headaches?: (!) Yes  A stroke or loss of function or sensation?: No  Any other neurological problems?: No  Have you ever been treated for depression?: No  Are you having problems with crying spells or loss of self-esteem?: No  Have you ever required psychiatric care?: No  HaV you ever had hepatitis,liver disease?  No  Have you been treated for blood clots in your veins, deep vein thromosis, inflammation in the veins, thrombosis, phlebitis, pulmonary embolism or varicosities?: No  Have you had excessive bleeding after surgery or dental work?: No  Do you bleed more than other women after a cut or scratch?: No  Do you have a history of anemia?: No  Have you ever had thyroid problems or taken thyroid medication?: No   Do you have any endocrine problems?: Unknown  Have you ever been in a major accident or suffered serious trauma?: No  Within the last year, has anyone  hit, slapped, kicked or otherwise hurt you?: No  In the last year, has anyone forced you to have sex when you didn't want to?: No    Past Medical History 2   Have you ever received a blood transfusion?: No  Would you refuse a blood transfusion if a doctor judged it to be medically necessary?: No  Does anyone in your home smoke?: No  Do you use tobacco products?: No  Do you drink beer, wine or hard liquor?: No  Do you use any of the following: marijuana, speed, cocaine, heroin, hallucinogens or other drugs?: No  Is your blood type RH negative? unknown  Have you ever had abnormal antibodies in your blood?: unknown  Have you ever had asthma?: No  Have you ever had tuberculosis?: No  Do you have any allergies to drugs or over-the-counter medications?: No  Allergies: Dust Mites, Aspartame, Ethanol, Venlafaxine, Hydrochloride, Sertraline: No  Have you had any breast problems?: No  Have you ever ?: (!) Yes  Have you had any gynecological surgical procedures such as cervical conization, a LEEP procedure, laser treatment, cryosurgery of the cervix or a dilation and curettage, etc?: No  Have you ever had any other surgical procedures?: No  Have you been hospitalized for a nonsurgical reason excluding normal delivery?: No  Have you ever had any anesthetic complications?: No  Have you ever had an abnormal pap smear?: No    Past Medical History (Continued)  Do you have a history of abnormalities of the uterus?: No  Did your mother take TENISHA or any other hormones when she was pregnant with you?: Unknown  Did it take you more than a year to become pregnant?: No  Have you ever been evaluated or treated for infertility?: No  Is there a history of medical problems in your family, which you feel may be important to this pregnancy?: No  Do you have any other problems we have not asked about which you feel may be important to this pregnancy?: No    Symptoms since last menstrual period  Do you have any of the following symptoms:  abdominal pain, blood in stools or urine, chest pain, shortness of breath, coughing or vomiting up blood, your heart racing or skipping beats, nausea and vomiting, pain on urination or vaginal discharge or bleed: (!) Yes(abdominal pain,SOB,N&V)  Will the patient be 35 years old or older at the time of delivery?: No    Has the patient, baby's father or anyone in either family had:  Thalassemia (Italian, Greek, Mediterranean or  background only) and an MCV result less than 80?: No  Neural tube defect such as meningomyelocele, spina bifida or anencephaly?: No  Congenital heart defect?: No  Down's Syndrome?: No  Song-Sachs disease (Worship, Cajun, Kyrgyz-[place])?: No  Sickle cell disease or trait ()?: No  Hemophilia or other inherited problems of blood?: No  Muscular dystrophy?: No  Cystic fibrosis?: No  Lipscomb's chorea?: No  Mental retardation/autism?: No  Any other inherited genetic or chromosomal disorder?: No  Maternal metabolic disorder (e.g Insulin-dependent diabetes, PKU)?: No  A child with birth defects not listed above?: No  Recurrent pregnancy loss or stillbirth?: No   Has the patient had any medications/street drugs/alcohol since her last menstrual period?: No  Does the patient or baby's father have any other genetic risks?: No    Infection History   Do you object to being tested for Hepatitis B?: No  Do you object to being tested for HIV?: No   Do you feel that you are at high risk for coming in contact with the AIDS virus?: No  Have you ever been treated for tuberculosis?: No  Have you ever had a positive skin test for tuberculosis?: No  Do you live with someone who has tuberculosis?: No  Have you ever been exposed to tuberculosis?: No  Do you have genital herpes?: No  Does your partner have genital herpes?: No  Have you had a viral illness since your last period?: No  Have you ever had gonorrhea, chlamydia, syphilis, venereal warts, trichomoniasis, pelvic inflammatory disease or any other  sexually transmitted disease?: No  Do you know if you are a genital group B streptococcus carrier?: Unknown  Have you had chicken pox/varicella?: No   Have you been vaccinated against chicken Pox?: (!) Yes  Have you had any other infectious diseases?: No

## 2020-06-11 ENCOUNTER — APPOINTMENT (OUTPATIENT)
Dept: OBGYN | Facility: CLINIC | Age: 35
End: 2020-06-11
Payer: MEDICAID

## 2020-06-25 ENCOUNTER — PRENATAL OFFICE VISIT (OUTPATIENT)
Dept: OBGYN | Facility: CLINIC | Age: 35
End: 2020-06-25
Payer: MEDICAID

## 2020-06-25 VITALS — SYSTOLIC BLOOD PRESSURE: 87 MMHG | HEART RATE: 73 BPM | WEIGHT: 160.4 LBS | DIASTOLIC BLOOD PRESSURE: 57 MMHG

## 2020-06-25 DIAGNOSIS — O21.0 HYPEREMESIS GRAVIDARUM: ICD-10-CM

## 2020-06-25 DIAGNOSIS — Z34.80 SUPERVISION OF OTHER NORMAL PREGNANCY, ANTEPARTUM: Primary | ICD-10-CM

## 2020-06-25 LAB
ABO + RH BLD: NORMAL
ABO + RH BLD: NORMAL
BLD GP AB SCN SERPL QL: NORMAL
BLOOD BANK CMNT PATIENT-IMP: NORMAL
ERYTHROCYTE [DISTWIDTH] IN BLOOD BY AUTOMATED COUNT: 14 % (ref 10–15)
HCT VFR BLD AUTO: 30.9 % (ref 35–47)
HGB BLD-MCNC: 10.4 G/DL (ref 11.7–15.7)
MCH RBC QN AUTO: 25.1 PG (ref 26.5–33)
MCHC RBC AUTO-ENTMCNC: 33.7 G/DL (ref 31.5–36.5)
MCV RBC AUTO: 75 FL (ref 78–100)
PLATELET # BLD AUTO: 210 10E9/L (ref 150–450)
RBC # BLD AUTO: 4.14 10E12/L (ref 3.8–5.2)
SPECIMEN EXP DATE BLD: NORMAL
WBC # BLD AUTO: 3.9 10E9/L (ref 4–11)

## 2020-06-25 PROCEDURE — 86762 RUBELLA ANTIBODY: CPT | Performed by: OBSTETRICS & GYNECOLOGY

## 2020-06-25 PROCEDURE — 87491 CHLMYD TRACH DNA AMP PROBE: CPT | Performed by: OBSTETRICS & GYNECOLOGY

## 2020-06-25 PROCEDURE — 86850 RBC ANTIBODY SCREEN: CPT | Performed by: OBSTETRICS & GYNECOLOGY

## 2020-06-25 PROCEDURE — 85027 COMPLETE CBC AUTOMATED: CPT | Performed by: OBSTETRICS & GYNECOLOGY

## 2020-06-25 PROCEDURE — 86900 BLOOD TYPING SEROLOGIC ABO: CPT | Performed by: OBSTETRICS & GYNECOLOGY

## 2020-06-25 PROCEDURE — T1013 SIGN LANG/ORAL INTERPRETER: HCPCS | Mod: U3 | Performed by: OBSTETRICS & GYNECOLOGY

## 2020-06-25 PROCEDURE — 87086 URINE CULTURE/COLONY COUNT: CPT | Performed by: OBSTETRICS & GYNECOLOGY

## 2020-06-25 PROCEDURE — G0145 SCR C/V CYTO,THINLAYER,RESCR: HCPCS | Performed by: OBSTETRICS & GYNECOLOGY

## 2020-06-25 PROCEDURE — G0499 HEPB SCREEN HIGH RISK INDIV: HCPCS | Performed by: OBSTETRICS & GYNECOLOGY

## 2020-06-25 PROCEDURE — 86901 BLOOD TYPING SEROLOGIC RH(D): CPT | Performed by: OBSTETRICS & GYNECOLOGY

## 2020-06-25 PROCEDURE — 87591 N.GONORRHOEAE DNA AMP PROB: CPT | Performed by: OBSTETRICS & GYNECOLOGY

## 2020-06-25 PROCEDURE — 87389 HIV-1 AG W/HIV-1&-2 AB AG IA: CPT | Performed by: OBSTETRICS & GYNECOLOGY

## 2020-06-25 PROCEDURE — 99203 OFFICE O/P NEW LOW 30 MIN: CPT | Performed by: OBSTETRICS & GYNECOLOGY

## 2020-06-25 PROCEDURE — 86780 TREPONEMA PALLIDUM: CPT | Performed by: OBSTETRICS & GYNECOLOGY

## 2020-06-25 PROCEDURE — 36415 COLL VENOUS BLD VENIPUNCTURE: CPT | Performed by: OBSTETRICS & GYNECOLOGY

## 2020-06-25 PROCEDURE — 87624 HPV HI-RISK TYP POOLED RSLT: CPT | Performed by: OBSTETRICS & GYNECOLOGY

## 2020-06-25 PROCEDURE — G0476 HPV COMBO ASSAY CA SCREEN: HCPCS | Performed by: OBSTETRICS & GYNECOLOGY

## 2020-06-25 RX ORDER — ONDANSETRON 8 MG/1
8 TABLET, ORALLY DISINTEGRATING ORAL EVERY 8 HOURS PRN
Qty: 30 TABLET | Refills: 1 | Status: SHIPPED | OUTPATIENT
Start: 2020-06-25

## 2020-06-25 ASSESSMENT — PATIENT HEALTH QUESTIONNAIRE - PHQ9: SUM OF ALL RESPONSES TO PHQ QUESTIONS 1-9: 3

## 2020-06-25 NOTE — LETTER
July 2, 2020    Maik Yang  [address] [facility] [address]    Dear ,  This letter is regarding your recent Pap smear (cervical cancer screening) and Human Papillomavirus (HPV) test.  We are happy to inform you that your Pap smear result is normal. Cervical cancer is closely linked with certain types of HPV. Your results showed no evidence of high-risk HPV.  We recommend you have your next PAP smear and HPV test in 5 years.  You will still need to return to the clinic every year for an annual exam and other preventive tests.  If you have additional questions regarding this result, please call our registered nurse, Alma at [phone].  Sincerely,    Cephas Mawuena Agbeh, MD //reina

## 2020-06-25 NOTE — PROGRESS NOTES
Maik is a 34 year old  @  9w4d weeks here for new ob visit.    She is new to clinic. She is from [place] and all her other pregnancies were delivered there. She understands some English, but requireed Turkmen Video  ( [facility])  See Ob questionnaire for pertinent components of HPI.  Current Issues include: nausea, moderate  vomiting, daily    OB History    Para Term  AB Living   3 2 1 0 0 2   SAB TAB Ectopic Multiple Live Births   0 0 0 0 2      # Outcome Date GA Lbr Cricket/2nd Weight Sex Delivery Anes PTL Lv   3 Current            2 Para 14 40w0d  2.268 kg (5 lb) M    LAZARO      Name: Aser   1 Term 08/01/10 40w0d  2.268 kg (5 lb) M    LAZARO      Name: Leahjin     Past Surgical History:   Procedure Laterality Date     NO HISTORY OF SURGERY       Past Medical History:   Diagnosis Date     Migraines      Past Surgical History:   Procedure Laterality Date     NO HISTORY OF SURGERY       Patient Active Problem List    Diagnosis Date Noted     Prenatal care, subsequent pregnancy 06/10/2020     Priority: Medium      No Known Allergies  Current Outpatient Medications   Medication Sig Dispense Refill     ondansetron (ZOFRAN-ODT) 8 MG ODT tab Take 1 tablet (8 mg) by mouth every 8 hours as needed for nausea 30 tablet 1     Prenatal Vit-Fe Fumarate-FA (PRENATAL MULTIVITAMIN W/IRON) 27-0.8 MG tablet Take 1 tablet by mouth daily         Past Medical History of Father of Baby:   No significant medical history    Physical Exam: BP (!) 87/57   Pulse 73   Wt 72.8 kg (160 lb 6.4 oz)   LMP 2020   Breastfeeding No   General: Well developed, well nourished female  Skin: Normal  HEENT: Normal  Neck: Supple and Supple,no adenopathy,thyroid normal  Chest: Clear  Heart: Regular rate, rhythm and Regular rate, rhythm,No murmur, rub, gallop  Breasts: Not Examined  Abdomen: Benign, Soft, flat, non-tender and Benign,Soft, flat, non-tender,No masses, organomegaly,No inguinal nodes,Bowel sounds  normoactive   Extremities: Normal  Neurological: Normal   Perineum: Intact   Vulva: Normal  Vagina: Normal mucosa, no discharge  Cervix: Parous, closed, mobile, no discharge  Uterus: 10 weeks, Normal shape, position and consistency   Adnexa: Normal  Bony Pelvis: Adequate     NURSE PRESENT FOR EXAM  A/P 34 year old  at  9w4d weeks    ICD-10-CM    1. Supervision of other normal pregnancy, antepartum  Z34.80 US OB < 14 Weeks Single     ABO/Rh type and screen     Hepatitis B surface antigen     Rubella Antibody IgG Quantitative     CBC with platelets     Urine Culture Aerobic Bacterial     Neisseria gonorrhoeae PCR     Chlamydia trachomatis PCR     HIV Antigen Antibody Combo     Treponema Abs w Reflex to RPR and Titer     Pap imaged thin layer screen with HPV - recommended age 30 - 65 years (select HPV order below)     HPV High Risk Types DNA Cervical     ondansetron (ZOFRAN-ODT) 8 MG ODT tab   2. Hyperemesis gravidarum  O21.0 US OB < 14 Weeks Single     ABO/Rh type and screen     Hepatitis B surface antigen     Rubella Antibody IgG Quantitative     CBC with platelets     Urine Culture Aerobic Bacterial     Neisseria gonorrhoeae PCR     Chlamydia trachomatis PCR     HIV Antigen Antibody Combo     Treponema Abs w Reflex to RPR and Titer     Pap imaged thin layer screen with HPV - recommended age 30 - 65 years (select HPV order below)     HPV High Risk Types DNA Cervical     ondansetron (ZOFRAN-ODT) 8 MG ODT tab       - Discussed physician coverage, tertiary support, diet, exercise, weight gain, schedule of visits, routine and indicated ultrasounds, and childbirth education.     -Options for  testing for chromosomal and birth defects were discussed with the patient.  Diagnostic tests include CVS and Amniocentesis.  We discussed that these tests are definitive but invasive and do carry a risk of fetal loss.    Screening tests include nuchal translucency/blood marker testing in the first trimester and quad  screening in the second trimester.  We discussed that these are screening tests and not diagnostic tests and that false positives and negatives are a distinct possibility.      Prenatal package is provided.    return to clinic in 4 weeks.    CEPHAS AGBEH, MD.

## 2020-06-25 NOTE — PATIENT INSTRUCTIONS
If you have any questions regarding your visit, Please contact your care team.  [facility]: [phone]  [facility] CLINIC HOURS TELEPHONE NUMBER   Cephas Agbeh, M.D.      Jen Jaimes-  Laura-         Monday-[place]    8:00a.m-4:45 p.m    Tuesday--[place]     8:00a.m-4:45 p.m.    Thursday-[place]    8:00a.m-4:45 p.m.    Friday-[place]    8:00a.m-4:45 p.m    [facility]   [address] [place] [address]   [phone]-Ask for [facility]   Fax [phone]   [id number]-[phone]     [facility] Labor and Delivery   [address] [place] [address] [place] [address]   [phone]    [facility]  [address] [facility] [address]  [phone]  [id number]-[phone]   Urgent Care locations:    [facility] Monday-Friday  5 pm - 9 pm  Saturday and Sunday   9 am - 5 pm   Monday-Friday   5 pm - 9 pm  Saturday and Sunday  9 am - 5 pm    [phone]   If you need a medication refill, please contact your pharmacy. Please allow 3 business days for your refill to be completed.  As always, Thank you for trusting us with your healthcare needs!

## 2020-06-26 DIAGNOSIS — Z34.80 SUPERVISION OF OTHER NORMAL PREGNANCY, ANTEPARTUM: Primary | ICD-10-CM

## 2020-06-26 DIAGNOSIS — D50.9 IRON DEFICIENCY ANEMIA, UNSPECIFIED IRON DEFICIENCY ANEMIA TYPE: ICD-10-CM

## 2020-06-26 LAB
BACTERIA SPEC CULT: NORMAL
C TRACH DNA SPEC QL NAA+PROBE: NEGATIVE
HBV SURFACE AG SERPL QL IA: NONREACTIVE
HIV 1+2 AB+HIV1 P24 AG SERPL QL IA: NONREACTIVE
N GONORRHOEA DNA SPEC QL NAA+PROBE: NEGATIVE
RUBV IGG SERPL IA-ACNC: 35 IU/ML
SPECIMEN SOURCE: NORMAL
T PALLIDUM AB SER QL: NONREACTIVE

## 2020-06-26 RX ORDER — FERROUS SULFATE 325(65) MG
325 TABLET ORAL 2 TIMES DAILY
Qty: 60 TABLET | Refills: 3 | Status: SHIPPED | OUTPATIENT
Start: 2020-06-26

## 2020-06-29 ENCOUNTER — ANCILLARY PROCEDURE (OUTPATIENT)
Dept: ULTRASOUND IMAGING | Facility: CLINIC | Age: 35
End: 2020-06-29
Attending: OBSTETRICS & GYNECOLOGY
Payer: MEDICAID

## 2020-06-29 DIAGNOSIS — Z34.80 SUPERVISION OF OTHER NORMAL PREGNANCY, ANTEPARTUM: ICD-10-CM

## 2020-06-29 DIAGNOSIS — O21.0 HYPEREMESIS GRAVIDARUM: ICD-10-CM

## 2020-06-29 LAB
COPATH REPORT: NORMAL
PAP: NORMAL

## 2020-06-29 PROCEDURE — 76805 OB US >/= 14 WKS SNGL FETUS: CPT | Performed by: RADIOLOGY

## 2020-06-30 ENCOUNTER — APPOINTMENT (OUTPATIENT)
Dept: INTERPRETER SERVICES | Facility: CLINIC | Age: 35
End: 2020-06-30
Payer: MEDICAID

## 2020-06-30 ENCOUNTER — TELEPHONE (OUTPATIENT)
Dept: OBGYN | Facility: CLINIC | Age: 35
End: 2020-06-30

## 2020-06-30 NOTE — TELEPHONE ENCOUNTER
, 15w4d.  Last prenatal visit was on 2020.    Unable to reach patient via phone using an Khmer . Left message to call clinic back at [phone] and ask for Women's Health.      Belen Redman RN

## 2020-06-30 NOTE — TELEPHONE ENCOUNTER
[facility] Call Center    Phone Message    May a detailed message be left on voicemail: yes     Reason for Call: Other: Pt states she has been having headaches and looking for a prescription she can take. Pt states she will need an  when called back. López advise.      Action Taken: Message routed to:  [facility] p [phone]    Travel Screening: Not Applicable

## 2020-07-01 LAB
FINAL DIAGNOSIS: NORMAL
HPV HR 12 DNA CVX QL NAA+PROBE: NEGATIVE
HPV16 DNA SPEC QL NAA+PROBE: NEGATIVE
HPV18 DNA SPEC QL NAA+PROBE: NEGATIVE
SPECIMEN DESCRIPTION: NORMAL
SPECIMEN SOURCE CVX/VAG CYTO: NORMAL

## 2020-07-01 NOTE — TELEPHONE ENCOUNTER
Unable to reach patient via phone. RN left a message and instructed patient to call the clinic at [phone] and ask for Women's Health.    Anabel Chiang RN on 7/1/2020 at 9:08 AM

## 2020-07-02 NOTE — TELEPHONE ENCOUNTER
Unable to reach patient via phone. RN left a message using  services #[phone] and instructed patient to call the clinic at [phone] and ask for Women's Health.    Anabel Chiang RN on 7/2/2020 at 9:32 AM

## 2020-07-03 NOTE — TELEPHONE ENCOUNTER
Unable to reach patient via phone. RN left a message using  services #[phone] and instructed patient to call the clinic at [phone] and ask for Women's Health.    Anabel Chiang RN on 7/3/2020 at 9:55 AM

## 2020-07-06 NOTE — TELEPHONE ENCOUNTER
Closing encounter at this time as we have attempted to reach pt x4 without reaching her and not receiving a call back.  Pt is scheduled for a prenatal visit on 7/21.    Belen Redman RN

## 2020-07-21 ENCOUNTER — PRENATAL OFFICE VISIT (OUTPATIENT)
Dept: OBGYN | Facility: CLINIC | Age: 35
End: 2020-07-21
Payer: COMMERCIAL

## 2020-07-21 VITALS — HEART RATE: 81 BPM | DIASTOLIC BLOOD PRESSURE: 55 MMHG | WEIGHT: 162.5 LBS | SYSTOLIC BLOOD PRESSURE: 87 MMHG

## 2020-07-21 DIAGNOSIS — D50.9 IRON DEFICIENCY ANEMIA, UNSPECIFIED IRON DEFICIENCY ANEMIA TYPE: ICD-10-CM

## 2020-07-21 DIAGNOSIS — Z34.80 SUPERVISION OF OTHER NORMAL PREGNANCY, ANTEPARTUM: Primary | ICD-10-CM

## 2020-07-21 DIAGNOSIS — O21.0 HYPEREMESIS GRAVIDARUM: ICD-10-CM

## 2020-07-21 PROCEDURE — 99212 OFFICE O/P EST SF 10 MIN: CPT | Performed by: OBSTETRICS & GYNECOLOGY

## 2020-07-21 RX ORDER — PRENATAL VIT/IRON FUM/FOLIC AC 27MG-0.8MG
1 TABLET ORAL DAILY
Qty: 90 TABLET | Refills: 3 | Status: SHIPPED | OUTPATIENT
Start: 2020-07-21

## 2020-07-21 NOTE — PATIENT INSTRUCTIONS
If you have any questions regarding your visit, Please contact your care team.  [email or web address]: [phone]  [facility] CLINIC HOURS TELEPHONE NUMBER   Cephas Agbeh, M.D.      Jen Jaimes-  Laura-         Monday-[place]    8:00a.m-4:45 p.m    Tuesday--[place]     8:00a.m-4:45 p.m.    Thursday-[place]lad    8:00a.m-4:45 p.m.    Friday-[place]lad    8:00a.m-4:45 p.m    [facility]   [address] [place] [address]   [phone]-Ask for [facility]   Fax [phone]   [id number]-[phone]     [facility] Labor and Delivery   [address] [place] [address] [place] [address]   [phone]    [facility]  [address] [facility] [address]  [phone]  [phone]   Urgent Care locations:    [facility] Monday-Friday  5 pm - 9 pm  Saturday and Sunday   9 am - 5 pm   Monday-Friday   5 pm - 9 pm  Saturday and Sunday  9 am - 5 pm    [phone]   If you need a medication refill, please contact your pharmacy. Please allow 3 business days for your refill to be completed.  As always, Thank you for trusting us with your healthcare needs!

## 2020-07-21 NOTE — PROGRESS NOTES
18w4d. Doing well without issues/concerns.  Quad screen not done per pt request .  Routine anticipatory guidance.  U/S  Ordered.Visit per [employer].    ICD-10-CM    1. Supervision of other normal pregnancy, antepartum  Z34.80 US OB > 14 Weeks     Prenatal Vit-Fe Fumarate-FA (PRENATAL MULTIVITAMIN W/IRON) 27-0.8 MG tablet   2. Hyperemesis gravidarum  O21.0    3. Iron deficiency anemia, unspecified iron deficiency anemia type  D50.9      CEPHAS AGBEH, MD.

## 2020-08-12 ENCOUNTER — ANCILLARY PROCEDURE (OUTPATIENT)
Dept: ULTRASOUND IMAGING | Facility: CLINIC | Age: 35
End: 2020-08-12
Attending: OBSTETRICS & GYNECOLOGY
Payer: COMMERCIAL

## 2020-08-12 DIAGNOSIS — Z34.80 SUPERVISION OF OTHER NORMAL PREGNANCY, ANTEPARTUM: ICD-10-CM

## 2020-08-12 PROCEDURE — 76805 OB US >/= 14 WKS SNGL FETUS: CPT | Performed by: RADIOLOGY

## 2020-08-13 DIAGNOSIS — Z34.80 SUPERVISION OF OTHER NORMAL PREGNANCY, ANTEPARTUM: Primary | ICD-10-CM

## 2022-12-28 ENCOUNTER — LAB REQUISITION (OUTPATIENT)
Dept: LAB | Facility: CLINIC | Age: 37
End: 2022-12-28
Payer: MEDICAID

## 2022-12-28 DIAGNOSIS — R53.83 OTHER FATIGUE: ICD-10-CM

## 2022-12-28 DIAGNOSIS — Z86.32 PERSONAL HISTORY OF GESTATIONAL DIABETES: ICD-10-CM

## 2022-12-28 LAB
ERYTHROCYTE [DISTWIDTH] IN BLOOD BY AUTOMATED COUNT: 13.1 % (ref 10–15)
HBA1C MFR BLD: 5.9 %
HCT VFR BLD AUTO: 35.8 % (ref 35–47)
HGB BLD-MCNC: 11.2 G/DL (ref 11.7–15.7)
MCH RBC QN AUTO: 24.1 PG (ref 26.5–33)
MCHC RBC AUTO-ENTMCNC: 31.3 G/DL (ref 31.5–36.5)
MCV RBC AUTO: 77 FL (ref 78–100)
PLATELET # BLD AUTO: 295 10E3/UL (ref 150–450)
RBC # BLD AUTO: 4.65 10E6/UL (ref 3.8–5.2)
WBC # BLD AUTO: 5.3 10E3/UL (ref 4–11)

## 2022-12-28 PROCEDURE — 84443 ASSAY THYROID STIM HORMONE: CPT | Mod: ORL | Performed by: NURSE PRACTITIONER

## 2022-12-28 PROCEDURE — 85027 COMPLETE CBC AUTOMATED: CPT | Mod: ORL | Performed by: NURSE PRACTITIONER

## 2022-12-28 PROCEDURE — 83036 HEMOGLOBIN GLYCOSYLATED A1C: CPT | Mod: ORL | Performed by: NURSE PRACTITIONER

## 2022-12-29 LAB — TSH SERPL DL<=0.005 MIU/L-ACNC: 1.91 UIU/ML (ref 0.3–4.2)

## 2023-08-16 ENCOUNTER — APPOINTMENT (OUTPATIENT)
Dept: URBAN - METROPOLITAN AREA CLINIC 252 | Age: 38
Setting detail: DERMATOLOGY
End: 2023-08-18

## 2023-08-16 VITALS — RESPIRATION RATE: 18 BRPM | WEIGHT: 179 LBS | HEIGHT: 63 IN

## 2023-08-16 DIAGNOSIS — L91.8 OTHER HYPERTROPHIC DISORDERS OF THE SKIN: ICD-10-CM

## 2023-08-16 DIAGNOSIS — L65.9 NONSCARRING HAIR LOSS, UNSPECIFIED: ICD-10-CM

## 2023-08-16 PROCEDURE — OTHER ADDITIONAL NOTES: OTHER

## 2023-08-16 PROCEDURE — 99203 OFFICE O/P NEW LOW 30 MIN: CPT

## 2023-08-16 PROCEDURE — OTHER VENIPUNCTURE: OTHER

## 2023-08-16 PROCEDURE — OTHER COUNSELING: OTHER

## 2023-08-16 PROCEDURE — 36415 COLL VENOUS BLD VENIPUNCTURE: CPT

## 2023-08-16 PROCEDURE — OTHER ORDER TESTS: OTHER

## 2023-08-16 ASSESSMENT — LOCATION DETAILED DESCRIPTION DERM
LOCATION DETAILED: RIGHT SUPERIOR CENTRAL MALAR CHEEK
LOCATION DETAILED: LEFT ANTECUBITAL SKIN
LOCATION DETAILED: RIGHT SUPERIOR LATERAL MALAR CHEEK
LOCATION DETAILED: LEFT SUPERIOR LATERAL MALAR CHEEK

## 2023-08-16 ASSESSMENT — LOCATION SIMPLE DESCRIPTION DERM
LOCATION SIMPLE: LEFT CHEEK
LOCATION SIMPLE: RIGHT CHEEK
LOCATION SIMPLE: LEFT UPPER ARM

## 2023-08-16 ASSESSMENT — LOCATION ZONE DERM
LOCATION ZONE: FACE
LOCATION ZONE: ARM

## 2023-08-16 NOTE — PROCEDURE: ORDER TESTS
Performing Laboratory: [id number]
Expected Date Of Service: 08/16/2023
Billing Type: Third-Party Bill
Bill For Surgical Tray: no

## 2023-08-16 NOTE — PROCEDURE: ADDITIONAL NOTES
Render Risk Assessment In Note?: no
Additional Notes: Discussed cosmetic pricing.  Apply blt on arrival if pt wants to pursue treatment
Detail Level: Simple

## 2023-08-16 NOTE — PROCEDURE: COUNSELING
Detail Level: Zone
Patient Specific Counseling (Will Not Stick From Patient To Patient): - Discussed that hair loss can be complicated, multifactorial, and resistant to treatment.\\n- Discussed the potential causes of hair loss.\\n- Reviewed the previous scalp biopsy pathology report.\\n- Discussed a screening panel of blood work  (DHEAS, ferritin, H&H, K+, TSH, T3, T4, testosterone, prolactin, sex hormone binding globulin, vitamin B12, vitamin D.)\\n- Advised that finding a lab abnormality does not guarantee cause nor that correction of any abnormality will result in the correction of hair loss.\\n- Discussed the potential out of pocket cost for this blood work should this not be a covered benefit or if they have a high deductible insurance plan.\\n- Blood work price approximation sheet given to patient. Advised that the costs listed on the sheet is an approximation and may underestimate the actual cost.\\n- Advised that favorable results with hair loss treatment and a firm diagnosis can never be guaranteed despite any work-up.\\n- The patient expressed understanding.\\n- Patent expressed understanding of the potential out-of-pocket cost of blood work and would like to move forward and get this panel of blood work today.
Detail Level: Detailed
Patient Specific Counseling (Will Not Stick From Patient To Patient): Pt inquiring why she is getting these lesions, informed her that sometimes blood sugar changes can be a factor. Pt was dx pre diabetic, prescribed metformjn but she never took it.  Recommended that she speak with her PCP know, they may refer her to a nutritionist instead if she has hesitations with taking metformin

## 2023-10-09 ENCOUNTER — OFFICE VISIT (OUTPATIENT)
Dept: FAMILY MEDICINE | Facility: OTHER | Age: 38
End: 2023-10-09
Payer: COMMERCIAL

## 2023-10-09 ENCOUNTER — ANCILLARY PROCEDURE (OUTPATIENT)
Dept: GENERAL RADIOLOGY | Facility: OTHER | Age: 38
End: 2023-10-09
Attending: PHYSICIAN ASSISTANT
Payer: COMMERCIAL

## 2023-10-09 VITALS
SYSTOLIC BLOOD PRESSURE: 98 MMHG | OXYGEN SATURATION: 99 % | DIASTOLIC BLOOD PRESSURE: 68 MMHG | WEIGHT: 182 LBS | TEMPERATURE: 97.5 F | BODY MASS INDEX: 33.49 KG/M2 | HEIGHT: 62 IN | HEART RATE: 68 BPM | RESPIRATION RATE: 14 BRPM

## 2023-10-09 DIAGNOSIS — M25.531 RIGHT WRIST PAIN: ICD-10-CM

## 2023-10-09 DIAGNOSIS — M25.531 RIGHT WRIST PAIN: Primary | ICD-10-CM

## 2023-10-09 PROCEDURE — 73110 X-RAY EXAM OF WRIST: CPT | Mod: TC | Performed by: RADIOLOGY

## 2023-10-09 PROCEDURE — 99203 OFFICE O/P NEW LOW 30 MIN: CPT | Performed by: PHYSICIAN ASSISTANT

## 2023-10-09 RX ORDER — NAPROXEN 500 MG/1
500 TABLET ORAL 2 TIMES DAILY WITH MEALS
Qty: 60 TABLET | Refills: 0 | Status: SHIPPED | OUTPATIENT
Start: 2023-10-09

## 2023-10-09 ASSESSMENT — PAIN SCALES - GENERAL: PAINLEVEL: EXTREME PAIN (8)

## 2023-10-09 NOTE — LETTER
October 10, 2023      Ethan Yang  [address]  [facility] [address]        Dear ,    We are writing to inform you of your test results.    Your X-ray showed no signs of fracture.     Resulted Orders   XR Wrist Right G/E 3 Views    Narrative    XR WRIST RIGHT G/E 3 VIEWS 10/9/2023 2:21 PM     HISTORY: volar wrist painful distal radius and ulna without injury;  Right wrist pain    COMPARISON: None.      Impression    IMPRESSION: Normal.    ASUNCION STREETER MD         SYSTEM ID:  [id number]       If you have any questions or concerns, please call the clinic at the number listed above.       Sincerely,      Jessica Luke PA-C

## 2023-10-09 NOTE — PROGRESS NOTES
Assessment & Plan     Right wrist pain  No neurovascular concerns on examination, no signs of inflammation of the joint or digits on the right hand. Suspect more of a tendon issue but did obtain x-ray which showed no bony pathology.  Recommended immobilization for 1-2 weeks as well as NSAIDs twice daily with food in stomach for 1-2 weeks as well.   If not improving over that time recommended follow-up with Orthopedics.   - XR Wrist Right G/E 3 Views; Future  - naproxen (NAPROSYN) 500 MG tablet; Take 1 tablet (500 mg) by mouth 2 times daily (with meals)    Options for treatment and follow-up care were reviewed with the patient and/or guardian. Patient and/or guardian engaged in the decision making process and verbalized understanding of the options discussed and agreed with the final plan.    Jessica Luke PA-C  [facility] Long is a 38 year old, presenting for the following health issues:  Wrist Injury (Right)      10/9/2023     1:52 PM   Additional Questions   Roomed by Agustina   Accompanied by Daughter         10/9/2023     1:52 PM   Patient Reported Additional Medications   Patient reports taking the following new medications none       Wrist Injury    History of Present Illness       Reason for visit:  Right wrist injury  Symptom onset:  1-2 weeks ago  Symptoms include:  Severe pain right wrist  Symptom intensity:  Severe  Symptom progression:  Worsening  Had these symptoms before:  Yes  Has tried/received treatment for these symptoms:  No  What makes it worse:  Hurts with certain movements  What makes it better:  Nothing    She eats 2-3 servings of fruits and vegetables daily.She consumes 1 sweetened beverage(s) daily.She exercises with enough effort to increase her heart rate 9 or less minutes per day.  She exercises with enough effort to increase her heart rate 3 or less days per week.   She is taking medications regularly.    She is accompanied by a virtual  ".    Her right wrist is painful. She is right-handed. This has been going on for about a week, but it has been severe for the past 3 days. She denies doing anything different that could have caused this. She denies any numbness or tingling in the hand. She denies any swelling, bruising, or redness. She has taken Advil for the pain, but it did not help much. Movement makes the pain worse. She can not do anything at home. She denies any chance of pregnancy.  No injuries, no fevers or chills.     Review of Systems   Constitutional, musculoskeletal, neuro, skin systems are negative, except as otherwise noted.      Objective    BP 98/68   Pulse 68   Temp 97.5  F (36.4  C) (Temporal)   Resp 14   Ht 1.562 m (5' 1.5\")   Wt 82.6 kg (182 lb)   LMP 10/07/2023 (Approximate)   SpO2 99%   BMI 33.83 kg/m    Body mass index is 33.83 kg/m .  Physical Exam   GENERAL: healthy, alert and no distress  MS: Right Upper extremity - full passive ROM of the elbow without pain, full passive ROM of the wrist with pain with full flexion and extension, Tinels produces pain localized to the area of tapping but no paresthesias,  strength normal and elicits no pain, there is tenderness to palpation over the volar surface of the distal radius and ulna over the carpal tunnel region into the thenar eminence of the hand.  No muscle atrophy noted.    SKIN: no suspicious lesions or rashes  PSYCH: mentation appears normal, affect normal/bright    Results for orders placed or performed in visit on 10/09/23   XR Wrist Right G/E 3 Views     Status: None    Narrative    XR WRIST RIGHT G/E 3 VIEWS 10/9/2023 2:21 PM     HISTORY: volar wrist painful distal radius and ulna without injury;  Right wrist pain    COMPARISON: None.      Impression    IMPRESSION: Normal.    ASUNCION STREETER MD         SYSTEM ID:  [id number]                     "

## 2023-11-13 ENCOUNTER — TELEPHONE (OUTPATIENT)
Dept: FAMILY MEDICINE | Facility: OTHER | Age: 38
End: 2023-11-13
Payer: COMMERCIAL

## 2023-11-13 NOTE — TELEPHONE ENCOUNTER
Patient was scheduled via [facility] for 11/28 at 840 for JR and he needs an  which requires 40 mins for his appt. Can someone help him get into an appropriate time slot?

## 2023-12-17 ENCOUNTER — HEALTH MAINTENANCE LETTER (OUTPATIENT)
Age: 38
End: 2023-12-17

## 2024-07-12 ENCOUNTER — TRANSCRIBE ORDERS (OUTPATIENT)
Dept: OTHER | Age: 39
End: 2024-07-12

## 2024-07-12 DIAGNOSIS — M46.1 SACROILIITIS (H): Primary | ICD-10-CM

## 2024-07-31 ENCOUNTER — THERAPY VISIT (OUTPATIENT)
Dept: PHYSICAL THERAPY | Facility: CLINIC | Age: 39
End: 2024-07-31
Attending: PHYSICAL MEDICINE & REHABILITATION
Payer: COMMERCIAL

## 2024-07-31 DIAGNOSIS — G89.29 CHRONIC LEFT-SIDED LOW BACK PAIN WITHOUT SCIATICA: ICD-10-CM

## 2024-07-31 DIAGNOSIS — M46.1 SACROILIITIS (H): Primary | ICD-10-CM

## 2024-07-31 DIAGNOSIS — M54.50 CHRONIC LEFT-SIDED LOW BACK PAIN WITHOUT SCIATICA: ICD-10-CM

## 2024-07-31 PROCEDURE — 97161 PT EVAL LOW COMPLEX 20 MIN: CPT | Mod: GP | Performed by: PHYSICAL THERAPIST

## 2024-07-31 PROCEDURE — 97530 THERAPEUTIC ACTIVITIES: CPT | Mod: GP | Performed by: PHYSICAL THERAPIST

## 2024-07-31 PROCEDURE — 97110 THERAPEUTIC EXERCISES: CPT | Mod: GP | Performed by: PHYSICAL THERAPIST

## 2024-07-31 NOTE — PROGRESS NOTES
PHYSICAL THERAPY EVALUATION  Type of Visit: Evaluation       Fall Risk Screen:  Fall screen completed by: PT  Have you fallen 2 or more times in the past year?: No  Have you fallen and had an injury in the past year?: No  Is patient a fall risk?: No    Subjective       Presenting condition or subjective complaint: Left lower back pain  Date of onset: 07/12/24 (PT referral date; symptoms present since 2022 and did have LBP in 2019 on the L)    Relevant medical history: -- (good health)   Dates & types of surgery:      Prior diagnostic imaging/testing results: X-ray     Prior therapy history for the same diagnosis, illness or injury: Yes 2019    Prior Level of Function  Transfers: Independent  Ambulation: Independent  ADL: Independent      Living Environment  Social support: With family members   Type of home: House   Stairs to enter the home: Yes 5     Ramp: No   Stairs inside the home: Yes 5     Help at home: None  Equipment owned:       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  She has not been feeling symptoms distal to the knee.  Paresthesia (yes/no):  no  Symptoms (improving/unchanging/worsening):  unchanging.   Symptoms commenced as a result of: unknown        Symptoms at onset (back/thigh/leg): L low back, thigh  Constant symptoms (back/thigh/leg): L low back  Intermittent symptoms (back/thigh/leg): L thigh    Symptoms are made worse with the following: bending, cleaning and cooking, twisting to the R, shifting her weight onto the L side, She will feel the  L thigh symptoms if she crosses the L thigh over the  R knee   Symptoms are made better with the following: laying on her back, resting    Disturbed sleep (yes/no):  no Sleeping postures (prone/sup/side R/L): L side and less frequently on her back     Previous history: R lower back pain in 2019  Previous treatments: 1 PT session in 2019; She had an JUNG this episode without any relief    Specific Questions:  Cough/Sneeze/Strain (pos/neg): negative  Bowel/Bladder (normal/abnormal): normal  Gait (normal/abnormal): normal  Medications (nil/NSAIDS/analg/steroids/anticoag/other):  iron, anti-inflammatory if needed,   Medical allergies:  none  Pertinent medical history:  unremarkable  Imaging (None/Xray/MRI/Other):  2018 - X-rays - unremarkable  Recent or major surgery (yes/no):  no  Night pain (yes/no): no  Accidents (yes/no): no  Unexplained weight loss (yes/no): no  Barriers at home: no  Other red flags: no    Postural Observation:   Sitting: Neutral  Change of posture: No effect  Standing: Lordotic  Lateral Shift: Nil.  Other Observations:   LROM FIS nil/min loss with pulling L LE, EIS nil/min loss with inc L LBP, R SGIS nil loss with inc L LBP, L SGIS min loss NE    Neurological:  Motor Deficit:  decreased strength resisted knee flexion due to inc LBP, otherwise strong and without pain    Reflexes:  symmetrical and brisk LE  Sensory Deficit:  symmetrical to light touch   Neurodynamic tests:  inc L LBP at end range with well leg and L LE.      Test Movements:   During: produces, abolishes, increases, decreases, no effect, centralizing, peripheralizing   After: better, worse, no better, no worse, no effect, centralized, peripheralized    Symptomatic response Mechanical response    During testing After testing Effect - increased ROM, decreased ROM, or key functional test No Effect   Pretest symptoms standin-6/10 L LB/posterior hip     Rep FIS       Rep EIS         Pretest symptoms lyin-6/10    During  testing After testing Effect - increased ROM, decreased ROM, or key functional test No Effect   Rep JAY Decreases    Better    Increased LROM with less L lower back pain.     Rep EIL         If required, pretest symptoms: 5-6/10   During testing After testing Effect - increased ROM, decreased ROM, or key functional test No Effect   Rep SGIS - R       Rep SGIS - L Decreases    No Better     x     Static Tests:   Sustained rotation in flexion - knees to th L - inc pain during, no worse after; no change in ROM and pain during ROM    Provisional Classification: Derangement - Asymmetrical, unilateral, symptoms above knee    Potential Drivers of Pain and/or Disability:     Principle of Management:  Education:  discussed centralization and peripheralization, expectations with HEP  Equipment provided:  none  Mechanical therapy (Y/N):  yes   Extension principle:    Lateral Principle:    Flexion principle:  JAY 10 reps, 6-8 times a day  Other:      Assessment & Plan   CLINICAL IMPRESSIONS  Medical Diagnosis: Sacralitis    Treatment Diagnosis: Sacralitis, chronic low back pain   Impression/Assessment: Patient is a 38 year old female with L sided back and posterior hip complaints.  The following significant findings have been identified: Pain, Decreased ROM/flexibility, Decreased strength, Decreased activity tolerance, and Impaired posture. These impairments interfere with their ability to perform self care tasks, recreational activities, and household chores as compared to previous level of function.     Clinical Decision Making (Complexity):  Clinical Presentation: Stable/Uncomplicated  Clinical Presentation Rationale: based on medical and personal factors listed in PT evaluation  Clinical Decision Making (Complexity): Low complexity    PLAN OF CARE  Treatment Interventions:  Interventions: Manual Therapy, Neuromuscular Re-education, Therapeutic Activity, Therapeutic Exercise, Self-Care/Home Management    Long Term Goals             Frequency of Treatment: 1 time a week  Duration of Treatment: (P) 8 weeks    Recommended Referrals to Other Professionals:   Education Assessment:   Learner/Method: (P) Patient;No Barriers to Learning;Pictures/Video;Demonstration;Listening  Education Comments: (P) PTRx exercises printed    Risks and benefits of evaluation/treatment have been explained.   Patient/Family/caregiver agrees with Plan of Care.     Evaluation Time:     PT Eval, Low Complexity Minutes (79400): 20   Present: Yes: Language: Welsh, ID Number/Identifier:       Signing Clinician: Sammie Vera, PT        [facility]                                                                                   OUTPATIENT PHYSICAL THERAPY      PLAN OF TREATMENT FOR OUTPATIENT REHABILITATION   Patient's Last Name, First Name, DOUGLAS Yang,Hadil    YOB: 1985   Provider's Name   [facility]   Medical Record No.  [id number]     Onset Date: 07/12/24 (PT referral date; symptoms present since 2022 and did have LBP in 2019 on the L)  Start of Care Date: (P) 07/31/24     Medical Diagnosis:  Sacralitis      PT Treatment Diagnosis:  Sacralitis, chronic low back pain Plan of Treatment  Frequency/Duration: 1 time a week/ (P) 8 weeks    Certification date from (P) 07/31/24 to (P) 09/25/24         See note for plan of treatment details and functional goals     Sammie Vera, PT                         I CERTIFY THE NEED FOR THESE SERVICES FURNISHED UNDER        THIS PLAN OF TREATMENT AND WHILE UNDER MY CARE .             Physician Signature               Date    X_____________________________________________________                  Referring Provider:  Irwin Mazariegos    Initial Assessment  See Epic Evaluation- Start of Care Date: (P) 07/31/24

## 2024-08-21 ENCOUNTER — THERAPY VISIT (OUTPATIENT)
Dept: PHYSICAL THERAPY | Facility: CLINIC | Age: 39
End: 2024-08-21
Payer: COMMERCIAL

## 2024-08-21 DIAGNOSIS — M54.16 LUMBAR RADICULOPATHY: Primary | ICD-10-CM

## 2024-08-21 PROCEDURE — 97530 THERAPEUTIC ACTIVITIES: CPT | Mod: GP | Performed by: PHYSICAL THERAPIST

## 2024-08-21 PROCEDURE — 97110 THERAPEUTIC EXERCISES: CPT | Mod: GP | Performed by: PHYSICAL THERAPIST

## 2024-08-28 ENCOUNTER — THERAPY VISIT (OUTPATIENT)
Dept: PHYSICAL THERAPY | Facility: CLINIC | Age: 39
End: 2024-08-28
Payer: COMMERCIAL

## 2024-08-28 DIAGNOSIS — M54.16 LUMBAR RADICULOPATHY: Primary | ICD-10-CM

## 2024-08-28 PROCEDURE — 97110 THERAPEUTIC EXERCISES: CPT | Mod: GP | Performed by: PHYSICAL THERAPIST

## 2024-09-22 ENCOUNTER — HEALTH MAINTENANCE LETTER (OUTPATIENT)
Age: 39
End: 2024-09-22

## 2024-12-13 PROBLEM — M54.16 LUMBAR RADICULOPATHY: Status: RESOLVED | Noted: 2024-08-21 | Resolved: 2024-12-13

## 2025-05-08 ENCOUNTER — TRANSCRIBE ORDERS (OUTPATIENT)
Dept: OTHER | Age: 40
End: 2025-05-08

## 2025-05-08 DIAGNOSIS — R00.2 PALPITATIONS: Primary | ICD-10-CM

## 2025-05-12 ENCOUNTER — PATIENT OUTREACH (OUTPATIENT)
Dept: CARE COORDINATION | Facility: CLINIC | Age: 40
End: 2025-05-12
Payer: COMMERCIAL

## 2025-05-19 ENCOUNTER — TELEPHONE (OUTPATIENT)
Dept: CARDIOLOGY | Facility: CLINIC | Age: 40
End: 2025-05-19
Payer: COMMERCIAL

## 2025-05-19 NOTE — TELEPHONE ENCOUNTER
Patient added to waitlist.    Ayleen Clement, RN, BSN  Cardiology RN Care Coordinator   [place]   Phone: [phone]  Fax: [phone] ([place]) [phone] ([place])

## 2025-05-19 NOTE — TELEPHONE ENCOUNTER
[facility] Call Center    Phone Message    May a detailed message be left on voicemail: yes     Reason for Call: Appointment Intake    Referring Provider Name:   Messi Baugh APRN CNP     Diagnosis and/or Symptoms: palpitations- ecg-sinus rhythm with sinus arrythmia     Unable to get in within 30 days per protocols    Action Taken: Other: Cardiology    Travel Screening: Not Applicable     Date of Service:                                                                       05-Dec-2022 18:13

## 2025-06-29 NOTE — PROGRESS NOTES
[facility]           Referring provider:Messi Baugh, APRN CNP      [address]  [address]   [facility] [address]     HPI: Ms. Ethan Yang is a 39 year old  female with PMH significant for    - Ethan Yang, 39-year-old female  - Experiencing irregular heartbeat for approximately 4.5 to 5 years  - Episodes occur irregularly, ranging from once a week to once a month  - Each episode lasts about an hour, during which she is unable to get up without risk of falling  - Last episode occurred a week ago  - Episodes have increased in duration over the years, now lasting at least an hour  - No history of passing out during episodes  - No family history of heart problems  - Underwent blood tests approximately 1.5 to 2 months ago  - Experiences difficulty breathing during physical activities like climbing stairs.  No chest pain, lower extremity edema.  - Patient is not on any cardiac medications.  - She has no known cardiac history.  No family history of coronary artery disease    Reviewed EKG 2020 which shows sinus rhythm.  No preexcitation.    Medications, personal, family, and social history reviewed with patient and revised.    PAST MEDICAL HISTORY:  Past Medical History:   Diagnosis Date    Migraines        CURRENT MEDICATIONS:  Current Outpatient Medications   Medication Sig Dispense Refill    ferrous sulfate (FEROSUL) 325 (65 Fe) MG tablet Take 1 tablet (325 mg) by mouth 2 times daily 60 tablet 3    naproxen (NAPROSYN) 500 MG tablet Take 1 tablet (500 mg) by mouth 2 times daily (with meals) 60 tablet 0    ondansetron (ZOFRAN-ODT) 8 MG ODT tab Take 1 tablet (8 mg) by mouth every 8 hours as needed for nausea 30 tablet 1    Prenatal Vit-Fe Fumarate-FA (PRENATAL MULTIVITAMIN W/IRON) 27-0.8 MG tablet Take 1 tablet by mouth daily 90 tablet 3    Prenatal Vit-Fe Fumarate-FA (PRENATAL MULTIVITAMIN W/IRON)  27-0.8 MG tablet Take 1 tablet by mouth daily         PAST SURGICAL HISTORY:  Past Surgical History:   Procedure Laterality Date    LASIK  8 years ago    NO HISTORY OF SURGERY         ALLERGIES:   No Known Allergies    FAMILY HISTORY:  Family History   Problem Relation Age of Onset    Diabetes Mother     Cancer No family hx of     Heart Disease No family hx of     Glaucoma No family hx of     Macular Degeneration No family hx of          SOCIAL HISTORY:  Social History     Tobacco Use    Smoking status: Never    Smokeless tobacco: Never   Vaping Use    Vaping status: Never Used   Substance Use Topics    Alcohol use: Never    Drug use: Never       ROS:   Constitutional: No fever, chills, or sweats. Weight stable.   Cardiovascular: As per HPI.       Exam:  BP 95/65 (BP Location: Right arm, Patient Position: Chair, Cuff Size: Adult Regular)   Pulse 78   Wt 82.6 kg (182 lb)   SpO2 98%   BMI 33.83 kg/m    GENERAL APPEARANCE: alert and no distress  HEENT: no icterus, no central cyanosis  LYMPH/NECK: no adenopathy, no asymmetry, JVP not elevated  RESPIRATORY: lungs clear to auscultation - no rales, rhonchi or wheezes, no use of accessory muscles, no retractions, respirations are unlabored, normal respiratory rate  CARDIOVASCULAR: regular rhythm, normal S1, S2, no S3 or S4 and no murmur, click or rub, precordium quiet with normal PMI.  GI: soft, non tender  EXTREMITIES:  no edema  NEURO: alert, normal speech,and affect  SKIN: no ecchymoses, no rashes     I have reviewed the labs and personally reviewed the imaging below and made my comment in the assessment and plan.    Labs:  CBC RESULTS:   Lab Results   Component Value Date    WBC 5.3 12/28/2022    WBC 3.9 (L) 06/25/2020    RBC 4.65 12/28/2022    RBC 4.14 06/25/2020    HGB 11.2 (L) 12/28/2022    HGB 10.4 (L) 06/25/2020    HCT 35.8 12/28/2022    HCT 30.9 (L) 06/25/2020    MCV 77 (L) 12/28/2022    MCV 75 (L) 06/25/2020    MCH 24.1 (L) 12/28/2022    MCH 25.1 (L)  "06/25/2020    MCHC 31.3 (L) 12/28/2022    MCHC 33.7 06/25/2020    RDW 13.1 12/28/2022    RDW 14.0 06/25/2020     12/28/2022     06/25/2020       BMP RESULTS:  No results found for: \"NA\", \"POTASSIUM\", \"CHLORIDE\", \"CO2\", \"ANIONGAP\", \"GLC\", \"BUN\", \"CR\", \"GFRESTIMATED\", \"GFRESTBLACK\", \"ANNABELLE\"         Echocardiogram  No results found for this or any previous visit (from the past 8760 hours).    EKG reviewed from Care Everywhere from 2020 shows sinus rhythm otherwise unremarkable.        Assessment and Plan:     1.  Sudden onset palpitations lasting up to an hour or so over the last 5 years.     - Assessment: Suspected abnormal heart rhythm based on the patient's symptoms of irregular heart palpitations.  No documented EKG at the time of palpitations.  Physical exam is benign.  EKG from 2020 shows sinus rhythm with no signs of structural heart disease.  No preexcitation.        Plan:        - Order blood tests to further investigate the cause of the palpitations.       - Provide a heart monitor to be worn for one month to document heart rhythm during episodes of palpitations. This will help in diagnosing any abnormal heart rhythms.       - Order an ultrasound of the heart to assess cardiac structure and function.       - Schedule a follow-up appointment in a couple of months to review the results of the blood tests, heart monitor, and ultrasound.    Muriel HOUSER documentation tool used in the creation of this note.     Total time spent today for this visit is 44 minutes including precharting, face-to-face clinic visit, review of labs/imaging and medical documentation.    Camilo CHAN MD  [facility] Division of Cardiology    Securely message with Cuffed and Wanted   "

## 2025-06-30 ENCOUNTER — ANCILLARY PROCEDURE (OUTPATIENT)
Dept: CARDIOLOGY | Facility: CLINIC | Age: 40
End: 2025-06-30
Attending: INTERNAL MEDICINE
Payer: COMMERCIAL

## 2025-06-30 ENCOUNTER — OFFICE VISIT (OUTPATIENT)
Dept: CARDIOLOGY | Facility: CLINIC | Age: 40
End: 2025-06-30
Attending: NURSE PRACTITIONER
Payer: COMMERCIAL

## 2025-06-30 ENCOUNTER — RESULTS FOLLOW-UP (OUTPATIENT)
Dept: CARDIOLOGY | Facility: CLINIC | Age: 40
End: 2025-06-30

## 2025-06-30 VITALS
HEART RATE: 78 BPM | OXYGEN SATURATION: 98 % | WEIGHT: 182 LBS | DIASTOLIC BLOOD PRESSURE: 65 MMHG | BODY MASS INDEX: 33.83 KG/M2 | SYSTOLIC BLOOD PRESSURE: 95 MMHG

## 2025-06-30 DIAGNOSIS — R00.2 PALPITATIONS: Primary | ICD-10-CM

## 2025-06-30 DIAGNOSIS — D50.8 OTHER IRON DEFICIENCY ANEMIA: Primary | ICD-10-CM

## 2025-06-30 DIAGNOSIS — R00.2 PALPITATIONS: ICD-10-CM

## 2025-06-30 LAB
ERYTHROCYTE [DISTWIDTH] IN BLOOD BY AUTOMATED COUNT: 12.6 % (ref 10–15)
EST. AVERAGE GLUCOSE BLD GHB EST-MCNC: 108 MG/DL
HBA1C MFR BLD: 5.4 % (ref 0–5.6)
HCT VFR BLD AUTO: 36.3 % (ref 35–47)
HGB BLD-MCNC: 11.6 G/DL (ref 11.7–15.7)
MCH RBC QN AUTO: 24.5 PG (ref 26.5–33)
MCHC RBC AUTO-ENTMCNC: 32 G/DL (ref 31.5–36.5)
MCV RBC AUTO: 77 FL (ref 78–100)
PLATELET # BLD AUTO: 288 10E3/UL (ref 150–450)
RBC # BLD AUTO: 4.74 10E6/UL (ref 3.8–5.2)
WBC # BLD AUTO: 3.8 10E3/UL (ref 4–11)

## 2025-06-30 PROCEDURE — 36415 COLL VENOUS BLD VENIPUNCTURE: CPT | Performed by: INTERNAL MEDICINE

## 2025-06-30 PROCEDURE — 3078F DIAST BP <80 MM HG: CPT | Performed by: INTERNAL MEDICINE

## 2025-06-30 PROCEDURE — 3074F SYST BP LT 130 MM HG: CPT | Performed by: INTERNAL MEDICINE

## 2025-06-30 PROCEDURE — 99203 OFFICE O/P NEW LOW 30 MIN: CPT | Mod: 25 | Performed by: INTERNAL MEDICINE

## 2025-06-30 PROCEDURE — 83036 HEMOGLOBIN GLYCOSYLATED A1C: CPT | Performed by: INTERNAL MEDICINE

## 2025-06-30 PROCEDURE — 80061 LIPID PANEL: CPT | Performed by: INTERNAL MEDICINE

## 2025-06-30 PROCEDURE — 84443 ASSAY THYROID STIM HORMONE: CPT | Performed by: INTERNAL MEDICINE

## 2025-06-30 PROCEDURE — 85027 COMPLETE CBC AUTOMATED: CPT | Performed by: INTERNAL MEDICINE

## 2025-06-30 PROCEDURE — 80053 COMPREHEN METABOLIC PANEL: CPT | Performed by: INTERNAL MEDICINE

## 2025-06-30 NOTE — LETTER
6/30/2025      RE: Ethan Yang  [address]  [facility] [address]       Dear Colleague,    Thank you for the opportunity to participate in the care of your patient, Ethan Yang, at the [facility] at [facility]. Please see a copy of my visit note below.                                                                                                   [facility]           Referring provider:Messi Baugh, APRN CNP      [address]  [address]   [facility] [address]     HPI: Ms. Ethan Yang is a 39 year old  female with PMH significant for    - Ethan Yang, 39-year-old female  - Experiencing irregular heartbeat for approximately 4.5 to 5 years  - Episodes occur irregularly, ranging from once a week to once a month  - Each episode lasts about an hour, during which she is unable to get up without risk of falling  - Last episode occurred a week ago  - Episodes have increased in duration over the years, now lasting at least an hour  - No history of passing out during episodes  - No family history of heart problems  - Underwent blood tests approximately 1.5 to 2 months ago  - Experiences difficulty breathing during physical activities like climbing stairs.  No chest pain, lower extremity edema.  - Patient is not on any cardiac medications.  - She has no known cardiac history.  No family history of coronary artery disease    Reviewed EKG 2020 which shows sinus rhythm.  No preexcitation.    Medications, personal, family, and social history reviewed with patient and revised.    PAST MEDICAL HISTORY:  Past Medical History:   Diagnosis Date     Migraines        CURRENT MEDICATIONS:  Current Outpatient Medications   Medication Sig Dispense Refill     ferrous sulfate (FEROSUL) 325 (65 Fe) MG tablet Take 1 tablet (325 mg) by mouth 2 times daily 60 tablet 3     naproxen (NAPROSYN) 500 MG tablet Take 1 tablet (500 mg) by mouth 2  times daily (with meals) 60 tablet 0     ondansetron (ZOFRAN-ODT) 8 MG ODT tab Take 1 tablet (8 mg) by mouth every 8 hours as needed for nausea 30 tablet 1     Prenatal Vit-Fe Fumarate-FA (PRENATAL MULTIVITAMIN W/IRON) 27-0.8 MG tablet Take 1 tablet by mouth daily 90 tablet 3     Prenatal Vit-Fe Fumarate-FA (PRENATAL MULTIVITAMIN W/IRON) 27-0.8 MG tablet Take 1 tablet by mouth daily         PAST SURGICAL HISTORY:  Past Surgical History:   Procedure Laterality Date     LASIK  8 years ago     NO HISTORY OF SURGERY         ALLERGIES:   No Known Allergies    FAMILY HISTORY:  Family History   Problem Relation Age of Onset     Diabetes Mother      Cancer No family hx of      Heart Disease No family hx of      Glaucoma No family hx of      Macular Degeneration No family hx of          SOCIAL HISTORY:  Social History     Tobacco Use     Smoking status: Never     Smokeless tobacco: Never   Vaping Use     Vaping status: Never Used   Substance Use Topics     Alcohol use: Never     Drug use: Never       ROS:   Constitutional: No fever, chills, or sweats. Weight stable.   Cardiovascular: As per HPI.       Exam:  BP 95/65 (BP Location: Right arm, Patient Position: Chair, Cuff Size: Adult Regular)   Pulse 78   Wt 82.6 kg (182 lb)   SpO2 98%   BMI 33.83 kg/m    GENERAL APPEARANCE: alert and no distress  HEENT: no icterus, no central cyanosis  LYMPH/NECK: no adenopathy, no asymmetry, JVP not elevated  RESPIRATORY: lungs clear to auscultation - no rales, rhonchi or wheezes, no use of accessory muscles, no retractions, respirations are unlabored, normal respiratory rate  CARDIOVASCULAR: regular rhythm, normal S1, S2, no S3 or S4 and no murmur, click or rub, precordium quiet with normal PMI.  GI: soft, non tender  EXTREMITIES:  no edema  NEURO: alert, normal speech,and affect  SKIN: no ecchymoses, no rashes     I have reviewed the labs and personally reviewed the imaging below and made my comment in the assessment and  "plan.    Labs:  CBC RESULTS:   Lab Results   Component Value Date    WBC 5.3 12/28/2022    WBC 3.9 (L) 06/25/2020    RBC 4.65 12/28/2022    RBC 4.14 06/25/2020    HGB 11.2 (L) 12/28/2022    HGB 10.4 (L) 06/25/2020    HCT 35.8 12/28/2022    HCT 30.9 (L) 06/25/2020    MCV 77 (L) 12/28/2022    MCV 75 (L) 06/25/2020    MCH 24.1 (L) 12/28/2022    MCH 25.1 (L) 06/25/2020    MCHC 31.3 (L) 12/28/2022    MCHC 33.7 06/25/2020    RDW 13.1 12/28/2022    RDW 14.0 06/25/2020     12/28/2022     06/25/2020       BMP RESULTS:  No results found for: \"NA\", \"POTASSIUM\", \"CHLORIDE\", \"CO2\", \"ANIONGAP\", \"GLC\", \"BUN\", \"CR\", \"GFRESTIMATED\", \"GFRESTBLACK\", \"ANNABELLE\"         Echocardiogram  No results found for this or any previous visit (from the past 8760 hours).    EKG reviewed from Care Everywhere from 2020 shows sinus rhythm otherwise unremarkable.        Assessment and Plan:     1.  Sudden onset palpitations lasting up to an hour or so over the last 5 years.     - Assessment: Suspected abnormal heart rhythm based on the patient's symptoms of irregular heart palpitations.  No documented EKG at the time of palpitations.  Physical exam is benign.  EKG from 2020 shows sinus rhythm with no signs of structural heart disease.  No preexcitation.        Plan:        - Order blood tests to further investigate the cause of the palpitations.       - Provide a heart monitor to be worn for one month to document heart rhythm during episodes of palpitations. This will help in diagnosing any abnormal heart rhythms.       - Order an ultrasound of the heart to assess cardiac structure and function.       - Schedule a follow-up appointment in a couple of months to review the results of the blood tests, heart monitor, and ultrasound.    Muriel HOUSER documentation tool used in the creation of this note.     Total time spent today for this visit is 44 minutes including precharting, face-to-face clinic visit, review of labs/imaging and medical " documentation.    Camilo CHAN MD  [facility] Division of Cardiology    Securely message with [employer]     Please do not hesitate to contact me if you have any questions/concerns.     Sincerely,     Camilo Chan MD

## 2025-06-30 NOTE — NURSING NOTE
"Chief Complaint   Patient presents with    New Patient     New general cardiology for palpitations- ecg-sinus rhythm with sinus arrythmia        Initial BP 95/65 (BP Location: Right arm, Patient Position: Chair, Cuff Size: Adult Regular)   Pulse 78   Wt 82.6 kg (182 lb)   SpO2 98%   BMI 33.83 kg/m   Estimated body mass index is 33.83 kg/m  as calculated from the following:    Height as of 10/9/23: 1.562 m (5' 1.5\").    Weight as of this encounter: 82.6 kg (182 lb)..  BP completed using cuff size: regular    Ramana CONDE, EMT    "

## 2025-06-30 NOTE — PATIENT INSTRUCTIONS
Thank you for coming to the [facility] @ [facility] [place]; please note the following instructions:    1.  Labs today    2. 30 day Event Monitor     3. Echocardiogram    4. Follow up with Dr. Linda next available        If you have any questions regarding your visit please contact your care team:     Cardiology  Telephone Number   Teetee ROUSE., RN  Ayleen BURROUGHS, RN  Mary WEAVER, RN  Daisy SIMS, RMA  Renea MORIN, RMA  Brandee GARCIA, [place]  Smita BURROUGHS, VF  Ramana MORIN, [phone] (option 1)   For scheduling appts:     [phone] (select option 1)       For the Device Clinic (Pacemakers and ICD's)  RN's :  Zuri Rivers   During business hours: [phone]    *After business hours:  [phone] (select option 4)      Normal test result notifications will be released via [employer] or mailed within 7 business days.  All other test results, will be communicated via telephone once reviewed by your cardiologist.    If you need a medication refill please contact your pharmacy.  Please allow 3 business days for your refill to be completed.    As always, thank you for trusting us with your health care needs!

## 2025-07-01 LAB
ALBUMIN SERPL BCG-MCNC: 4.2 G/DL (ref 3.5–5.2)
ALP SERPL-CCNC: 71 U/L (ref 40–150)
ALT SERPL W P-5'-P-CCNC: 7 U/L (ref 0–50)
ANION GAP SERPL CALCULATED.3IONS-SCNC: 10 MMOL/L (ref 7–15)
AST SERPL W P-5'-P-CCNC: 16 U/L (ref 0–45)
BILIRUB SERPL-MCNC: 0.4 MG/DL
BUN SERPL-MCNC: 12.3 MG/DL (ref 6–20)
CALCIUM SERPL-MCNC: 9.4 MG/DL (ref 8.8–10.4)
CHLORIDE SERPL-SCNC: 103 MMOL/L (ref 98–107)
CHOLEST SERPL-MCNC: 148 MG/DL
CREAT SERPL-MCNC: 0.54 MG/DL (ref 0.51–0.95)
EGFRCR SERPLBLD CKD-EPI 2021: >90 ML/MIN/1.73M2
FASTING STATUS PATIENT QL REPORTED: NO
FASTING STATUS PATIENT QL REPORTED: NO
GLUCOSE SERPL-MCNC: 71 MG/DL (ref 70–99)
HCO3 SERPL-SCNC: 24 MMOL/L (ref 22–29)
HDLC SERPL-MCNC: 67 MG/DL
LDLC SERPL CALC-MCNC: 67 MG/DL
NONHDLC SERPL-MCNC: 81 MG/DL
POTASSIUM SERPL-SCNC: 4.4 MMOL/L (ref 3.4–5.3)
PROT SERPL-MCNC: 7.5 G/DL (ref 6.4–8.3)
SODIUM SERPL-SCNC: 137 MMOL/L (ref 135–145)
TRIGL SERPL-MCNC: 72 MG/DL
TSH SERPL DL<=0.005 MIU/L-ACNC: 0.8 UIU/ML (ref 0.3–4.2)

## 2025-07-02 ENCOUNTER — MYC MEDICAL ADVICE (OUTPATIENT)
Dept: CARDIOLOGY | Facility: CLINIC | Age: 40
End: 2025-07-02
Payer: COMMERCIAL

## 2025-07-02 NOTE — TELEPHONE ENCOUNTER
Camilo Linda MD to Ethan Yang    Prediabetes condition has reversed and normal now.    Your hemoglobin is low.  I will will order iron as well.    Normal tests    Except your blood count       [employer] message sent to patient.    Ayleen Clement, RN, BSN  Cardiology RN Care Coordinator   [place]   Phone: [phone]  Fax: [phone] ([place]) [phone] ([place])

## 2025-07-08 NOTE — TELEPHONE ENCOUNTER
Attempted to call patient. Unable to speak with patient at this time.    Ayleen Clement, RN, BSN  Cardiology RN Care Coordinator   [place]   Phone: [phone]  Fax: [phone] ([place]) [phone] ([place])

## 2025-07-11 NOTE — TELEPHONE ENCOUNTER
Called with Yoruba . Left message for patient.    Ayleen Clement, RN, BSN  Cardiology RN Care Coordinator   [place]   Phone: [phone]  Fax: [phone] ([place]) [phone] ([place])

## 2025-07-14 NOTE — TELEPHONE ENCOUNTER
Multiple attempts to call patient. Unable to speak with patient. [employer] message already sent to patient.    Ayleen Clement, RN, BSN  Cardiology RN Care Coordinator   [place]   Phone: [phone]  Fax: [phone] ([place]) [phone] ([place])

## 2025-08-02 PROCEDURE — 93228 REMOTE 30 DAY ECG REV/REPORT: CPT | Performed by: INTERNAL MEDICINE
